# Patient Record
Sex: MALE | Race: OTHER | NOT HISPANIC OR LATINO | Employment: UNEMPLOYED | URBAN - METROPOLITAN AREA
[De-identification: names, ages, dates, MRNs, and addresses within clinical notes are randomized per-mention and may not be internally consistent; named-entity substitution may affect disease eponyms.]

---

## 2021-12-23 ENCOUNTER — HOSPITAL ENCOUNTER (EMERGENCY)
Facility: HOSPITAL | Age: 31
Discharge: LEFT BEFORE DISCHARGE | DRG: 770 | End: 2021-12-23
Attending: EMERGENCY MEDICINE | Admitting: EMERGENCY MEDICINE
Payer: MEDICARE

## 2021-12-23 VITALS
DIASTOLIC BLOOD PRESSURE: 64 MMHG | HEART RATE: 65 BPM | RESPIRATION RATE: 12 BRPM | OXYGEN SATURATION: 98 % | SYSTOLIC BLOOD PRESSURE: 108 MMHG | WEIGHT: 152.3 LBS | TEMPERATURE: 98.2 F

## 2021-12-23 DIAGNOSIS — F13.10 BENZODIAZEPINE ABUSE (HCC): ICD-10-CM

## 2021-12-23 DIAGNOSIS — F11.10 HEROIN ABUSE (HCC): Primary | ICD-10-CM

## 2021-12-23 DIAGNOSIS — F19.10 POLYSUBSTANCE ABUSE (HCC): ICD-10-CM

## 2021-12-23 LAB
AMPHETAMINES SERPL QL SCN: NEGATIVE
BARBITURATES UR QL: NEGATIVE
BENZODIAZ UR QL: NEGATIVE
COCAINE UR QL: POSITIVE
ETHANOL EXG-MCNC: 0 MG/DL
FLUAV RNA RESP QL NAA+PROBE: NEGATIVE
FLUBV RNA RESP QL NAA+PROBE: NEGATIVE
METHADONE UR QL: NEGATIVE
OPIATES UR QL SCN: POSITIVE
OXYCODONE+OXYMORPHONE UR QL SCN: NEGATIVE
PCP UR QL: NEGATIVE
RSV RNA RESP QL NAA+PROBE: NEGATIVE
SARS-COV-2 RNA RESP QL NAA+PROBE: NEGATIVE
THC UR QL: NEGATIVE

## 2021-12-23 PROCEDURE — 99282 EMERGENCY DEPT VISIT SF MDM: CPT | Performed by: EMERGENCY MEDICINE

## 2021-12-23 PROCEDURE — 80307 DRUG TEST PRSMV CHEM ANLYZR: CPT | Performed by: EMERGENCY MEDICINE

## 2021-12-23 PROCEDURE — 82075 ASSAY OF BREATH ETHANOL: CPT | Performed by: EMERGENCY MEDICINE

## 2021-12-23 PROCEDURE — 99284 EMERGENCY DEPT VISIT MOD MDM: CPT

## 2021-12-23 PROCEDURE — 0241U HB NFCT DS VIR RESP RNA 4 TRGT: CPT | Performed by: EMERGENCY MEDICINE

## 2021-12-23 RX ORDER — CLONIDINE HYDROCHLORIDE 0.1 MG/1
0.2 TABLET ORAL EVERY 12 HOURS SCHEDULED
Status: DISCONTINUED | OUTPATIENT
Start: 2021-12-23 | End: 2021-12-23 | Stop reason: HOSPADM

## 2021-12-23 RX ORDER — ACETAMINOPHEN 325 MG/1
650 TABLET ORAL EVERY 6 HOURS PRN
Status: DISCONTINUED | OUTPATIENT
Start: 2021-12-23 | End: 2021-12-23 | Stop reason: HOSPADM

## 2021-12-23 RX ORDER — ONDANSETRON 4 MG/1
4 TABLET, ORALLY DISINTEGRATING ORAL EVERY 6 HOURS PRN
Status: DISCONTINUED | OUTPATIENT
Start: 2021-12-23 | End: 2021-12-23 | Stop reason: HOSPADM

## 2021-12-23 RX ORDER — LORAZEPAM 1 MG/1
1 TABLET ORAL EVERY 6 HOURS PRN
Status: DISCONTINUED | OUTPATIENT
Start: 2021-12-23 | End: 2021-12-23 | Stop reason: HOSPADM

## 2021-12-23 RX ORDER — LOPERAMIDE HYDROCHLORIDE 2 MG/1
2 CAPSULE ORAL 4 TIMES DAILY PRN
Status: DISCONTINUED | OUTPATIENT
Start: 2021-12-23 | End: 2021-12-23 | Stop reason: HOSPADM

## 2021-12-23 RX ADMIN — CLONIDINE HYDROCHLORIDE 0.2 MG: 0.1 TABLET ORAL at 01:17

## 2021-12-23 NOTE — ED PROVIDER NOTES
History  Chief Complaint   Patient presents with    Detox Evaluation     wants detox for heroin and benzos  last heroin use yesterday morning  last benzo use 3 days ago  51-year-old gentleman presents requesting rehab/detox for abuse of heroin and benzos  Reports that he uses approximately two bundles of heroin daily and experiences withdrawal symptoms within about 24 hours  He also uses benzos intermittently  Denies any acute psychiatric issues and states his last time in rehab was several months ago  He reports that remain clean for about 1-2 months and then resumed use whenever relative passed away  Currently he denies any acute medical issues or concerns  Drug Problem  Severity:  Severe  Onset quality:  Gradual  Timing:  Constant  Progression:  Unchanged  Chronicity:  Recurrent  Relieved by:  Not using drugs  Worsened by:  Using drugs  Ineffective treatments:  Previous rehab  Associated symptoms: no abdominal pain, no chest pain, no congestion, no diarrhea, no fatigue, no fever, no myalgias, no nausea, no rhinorrhea, no sore throat and no vomiting        None       Past Medical History:   Diagnosis Date    Hepatitis C        Past Surgical History:   Procedure Laterality Date    TONSILLECTOMY         History reviewed  No pertinent family history  I have reviewed and agree with the history as documented  E-Cigarette/Vaping     E-Cigarette/Vaping Substances    Nicotine No     THC No     CBD No     Flavoring No     Other No     Unknown No      Social History     Tobacco Use    Smoking status: Current Every Day Smoker     Packs/day: 1 00    Smokeless tobacco: Never Used   Vaping Use    Vaping Use: Not on file   Substance Use Topics    Alcohol use: Yes     Comment: socially    Drug use: Yes     Types: Benzodiazepines, Heroin       Review of Systems   Constitutional: Negative for activity change, chills, fatigue and fever  HENT: Negative    Negative for congestion, postnasal drip, rhinorrhea, sinus pain, sore throat and trouble swallowing  Eyes: Negative  Respiratory: Negative  Cardiovascular: Negative for chest pain  Gastrointestinal: Negative for abdominal pain, constipation, diarrhea, nausea and vomiting  Endocrine: Negative  Genitourinary: Negative  Musculoskeletal: Negative  Negative for arthralgias, back pain and myalgias  Skin: Negative  Allergic/Immunologic: Negative  Neurological: Negative  Hematological: Negative  Psychiatric/Behavioral: Negative  Negative for suicidal ideas  All other systems reviewed and are negative  Physical Exam  Physical Exam  Vitals and nursing note reviewed  Constitutional:       General: He is not in acute distress  Appearance: Normal appearance  He is well-developed  He is not ill-appearing, toxic-appearing or diaphoretic  HENT:      Head: Normocephalic and atraumatic  Right Ear: External ear normal       Left Ear: External ear normal       Nose: Nose normal       Mouth/Throat:      Mouth: Mucous membranes are moist       Pharynx: Oropharynx is clear  Eyes:      Conjunctiva/sclera: Conjunctivae normal       Pupils: Pupils are equal, round, and reactive to light  Cardiovascular:      Rate and Rhythm: Normal rate and regular rhythm  Heart sounds: Normal heart sounds  Pulmonary:      Effort: Pulmonary effort is normal  No respiratory distress  Breath sounds: Normal breath sounds  Abdominal:      General: Bowel sounds are normal  There is no distension  Palpations: Abdomen is soft  Tenderness: There is no abdominal tenderness  There is no guarding  Musculoskeletal:         General: Normal range of motion  Cervical back: Neck supple  No rigidity  Right lower leg: No edema  Left lower leg: No edema  Skin:     General: Skin is warm and dry  Capillary Refill: Capillary refill takes less than 2 seconds  Neurological:      General: No focal deficit present  Mental Status: He is alert and oriented to person, place, and time  Psychiatric:         Mood and Affect: Mood normal          Behavior: Behavior normal          Vital Signs  ED Triage Vitals [12/23/21 0043]   Temperature Pulse Respirations Blood Pressure SpO2   97 8 °F (36 6 °C) 97 16 121/89 96 %      Temp Source Heart Rate Source Patient Position - Orthostatic VS BP Location FiO2 (%)   Tympanic Monitor Sitting Left arm --      Pain Score       --           Vitals:    12/23/21 0043   BP: 121/89   Pulse: 97   Patient Position - Orthostatic VS: Sitting         Visual Acuity      ED Medications  Medications - No data to display    Diagnostic Studies  Results Reviewed     None                 No orders to display              Procedures  Procedures         ED Course                                             MDM    Disposition  Final diagnoses:   None     ED Disposition     None      Follow-up Information    None         Patient's Medications    No medications on file       No discharge procedures on file      PDMP Review     None          ED Provider  Electronically Signed by           Sha Henry DO  12/23/21 3250

## 2021-12-23 NOTE — ED NOTES
[place] PROMISe indicates: Active  Recipient #[id number]  [facility] managed by [facility]  Phone number: [phone]

## 2021-12-23 NOTE — ED NOTES
Referral made to [facility]  and [employer]  Bed on hold at [facility]  Patient is completing a prescreen with [facility]

## 2021-12-23 NOTE — ED NOTES
Pt walked of room and this RN followed to confirm pt did not have an IV   Pt was pleasant and stated he wanted to go home and put his arms out to prove he had no IV     Karla Guillory RN  12/23/21 [address] [facility]  12/23/21 8287

## 2021-12-23 NOTE — ED NOTES
Pt laying in bed with no complaints at this time       Aster Hendrix, BAM  12/23/21 [address], RN  12/23/21 9772

## 2021-12-24 ENCOUNTER — HOSPITAL ENCOUNTER (INPATIENT)
Facility: HOSPITAL | Age: 31
LOS: 1 days | Discharge: LEFT BEFORE DISCHARGE | DRG: 770 | End: 2021-12-24
Attending: EMERGENCY MEDICINE | Admitting: EMERGENCY MEDICINE
Payer: MEDICARE

## 2021-12-24 VITALS
DIASTOLIC BLOOD PRESSURE: 50 MMHG | SYSTOLIC BLOOD PRESSURE: 136 MMHG | TEMPERATURE: 97.6 F | WEIGHT: 152.6 LBS | RESPIRATION RATE: 16 BRPM | HEART RATE: 57 BPM | OXYGEN SATURATION: 99 % | HEIGHT: 67 IN | BODY MASS INDEX: 23.95 KG/M2

## 2021-12-24 DIAGNOSIS — F41.9 ANXIETY AND DEPRESSION: ICD-10-CM

## 2021-12-24 DIAGNOSIS — F32.A ANXIETY AND DEPRESSION: ICD-10-CM

## 2021-12-24 DIAGNOSIS — F19.239 DRUG WITHDRAWAL (HCC): Primary | ICD-10-CM

## 2021-12-24 PROBLEM — F13.20 MODERATE BENZODIAZEPINE USE DISORDER (HCC): Status: ACTIVE | Noted: 2021-12-24

## 2021-12-24 PROBLEM — B18.2 CHRONIC HEPATITIS C WITHOUT HEPATIC COMA (HCC): Status: ACTIVE | Noted: 2021-12-24

## 2021-12-24 PROBLEM — F11.23 OPIOID WITHDRAWAL (HCC): Status: ACTIVE | Noted: 2021-12-24

## 2021-12-24 PROBLEM — F11.93 OPIOID WITHDRAWAL (HCC): Status: ACTIVE | Noted: 2021-12-24

## 2021-12-24 PROBLEM — F13.930 BENZODIAZEPINE WITHDRAWAL WITHOUT COMPLICATION (HCC): Status: ACTIVE | Noted: 2021-12-24

## 2021-12-24 PROBLEM — F17.210 TOBACCO DEPENDENCE DUE TO CIGARETTES: Status: ACTIVE | Noted: 2021-12-24

## 2021-12-24 PROBLEM — F13.230 BENZODIAZEPINE WITHDRAWAL WITHOUT COMPLICATION (HCC): Status: ACTIVE | Noted: 2021-12-24

## 2021-12-24 PROBLEM — F11.20 OPIOID USE DISORDER, SEVERE, DEPENDENCE (HCC): Status: ACTIVE | Noted: 2021-12-24

## 2021-12-24 PROBLEM — F19.90 IVDU (INTRAVENOUS DRUG USER): Status: ACTIVE | Noted: 2021-12-24

## 2021-12-24 LAB
ALBUMIN SERPL BCP-MCNC: 3.6 G/DL (ref 3–5.2)
ALP SERPL-CCNC: 68 U/L (ref 43–122)
ALT SERPL W P-5'-P-CCNC: 54 U/L
AMPHETAMINES SERPL QL SCN: NEGATIVE
ANION GAP SERPL CALCULATED.3IONS-SCNC: 1 MMOL/L (ref 5–14)
AST SERPL W P-5'-P-CCNC: 67 U/L (ref 17–59)
ATRIAL RATE: 64 BPM
BARBITURATES UR QL: NEGATIVE
BENZODIAZ UR QL: NEGATIVE
BILIRUB SERPL-MCNC: 0.48 MG/DL
BUN SERPL-MCNC: 18 MG/DL (ref 5–25)
CALCIUM SERPL-MCNC: 8.8 MG/DL (ref 8.4–10.2)
CHLORIDE SERPL-SCNC: 103 MMOL/L (ref 97–108)
CO2 SERPL-SCNC: 29 MMOL/L (ref 22–30)
COCAINE UR QL: POSITIVE
CREAT SERPL-MCNC: 1.12 MG/DL (ref 0.7–1.5)
EOSINOPHIL # BLD AUTO: 0.28 THOUSAND/UL (ref 0–0.4)
EOSINOPHIL NFR BLD MANUAL: 3 % (ref 0–6)
ERYTHROCYTE [DISTWIDTH] IN BLOOD BY AUTOMATED COUNT: 13.5 %
FLUAV RNA RESP QL NAA+PROBE: NEGATIVE
FLUBV RNA RESP QL NAA+PROBE: NEGATIVE
GFR SERPL CREATININE-BSD FRML MDRD: 87 ML/MIN/1.73SQ M
GLUCOSE SERPL-MCNC: 117 MG/DL (ref 70–99)
HCT VFR BLD AUTO: 35.8 % (ref 41–53)
HGB BLD-MCNC: 12.5 G/DL (ref 13.5–17.5)
LYMPHOCYTES # BLD AUTO: 3.07 THOUSAND/UL (ref 0.5–4)
LYMPHOCYTES # BLD AUTO: 33 % (ref 25–45)
MCH RBC QN AUTO: 31.2 PG (ref 26–34)
MCHC RBC AUTO-ENTMCNC: 34.9 G/DL (ref 31–36)
MCV RBC AUTO: 89 FL (ref 80–100)
METHADONE UR QL: NEGATIVE
MONOCYTES # BLD AUTO: 1.12 THOUSAND/UL (ref 0.2–0.9)
MONOCYTES NFR BLD AUTO: 12 % (ref 1–10)
NEUTS SEG # BLD: 4.84 THOUSAND/UL (ref 1.8–7.8)
NEUTS SEG NFR BLD AUTO: 52 %
OPIATES UR QL SCN: POSITIVE
OXYCODONE+OXYMORPHONE UR QL SCN: NEGATIVE
P AXIS: 47 DEGREES
PCP UR QL: NEGATIVE
PLATELET # BLD AUTO: 306 THOUSANDS/UL (ref 150–450)
PLATELET BLD QL SMEAR: ADEQUATE
PMV BLD AUTO: 7.1 FL (ref 8.9–12.7)
POTASSIUM SERPL-SCNC: 4.4 MMOL/L (ref 3.6–5)
PR INTERVAL: 112 MS
PROT SERPL-MCNC: 6.7 G/DL (ref 5.9–8.4)
QRS AXIS: 48 DEGREES
QRSD INTERVAL: 84 MS
QT INTERVAL: 386 MS
QTC INTERVAL: 398 MS
RBC # BLD AUTO: 4.01 MILLION/UL (ref 4.5–5.9)
RBC MORPH BLD: NORMAL
RSV RNA RESP QL NAA+PROBE: NEGATIVE
SARS-COV-2 RNA RESP QL NAA+PROBE: NEGATIVE
SODIUM SERPL-SCNC: 133 MMOL/L (ref 137–147)
T WAVE AXIS: 29 DEGREES
THC UR QL: NEGATIVE
TOTAL CELLS COUNTED SPEC: 100
VENTRICULAR RATE: 64 BPM
WBC # BLD AUTO: 9.3 THOUSAND/UL (ref 4.5–11)

## 2021-12-24 PROCEDURE — 96374 THER/PROPH/DIAG INJ IV PUSH: CPT

## 2021-12-24 PROCEDURE — 99285 EMERGENCY DEPT VISIT HI MDM: CPT | Performed by: PHYSICIAN ASSISTANT

## 2021-12-24 PROCEDURE — 93010 ELECTROCARDIOGRAM REPORT: CPT | Performed by: INTERNAL MEDICINE

## 2021-12-24 PROCEDURE — 93005 ELECTROCARDIOGRAM TRACING: CPT

## 2021-12-24 PROCEDURE — 85027 COMPLETE CBC AUTOMATED: CPT | Performed by: PHYSICIAN ASSISTANT

## 2021-12-24 PROCEDURE — 80307 DRUG TEST PRSMV CHEM ANLYZR: CPT | Performed by: PHYSICIAN ASSISTANT

## 2021-12-24 PROCEDURE — 36415 COLL VENOUS BLD VENIPUNCTURE: CPT | Performed by: PHYSICIAN ASSISTANT

## 2021-12-24 PROCEDURE — NC001 PR NO CHARGE: Performed by: PHYSICIAN ASSISTANT

## 2021-12-24 PROCEDURE — 96361 HYDRATE IV INFUSION ADD-ON: CPT

## 2021-12-24 PROCEDURE — 99235 HOSP IP/OBS SAME DATE MOD 70: CPT | Performed by: PHYSICIAN ASSISTANT

## 2021-12-24 PROCEDURE — 99284 EMERGENCY DEPT VISIT MOD MDM: CPT

## 2021-12-24 PROCEDURE — 85007 BL SMEAR W/DIFF WBC COUNT: CPT | Performed by: PHYSICIAN ASSISTANT

## 2021-12-24 PROCEDURE — 0241U HB NFCT DS VIR RESP RNA 4 TRGT: CPT | Performed by: PHYSICIAN ASSISTANT

## 2021-12-24 PROCEDURE — HZ2ZZZZ DETOXIFICATION SERVICES FOR SUBSTANCE ABUSE TREATMENT: ICD-10-PCS | Performed by: EMERGENCY MEDICINE

## 2021-12-24 PROCEDURE — 80053 COMPREHEN METABOLIC PANEL: CPT | Performed by: PHYSICIAN ASSISTANT

## 2021-12-24 PROCEDURE — 99253 IP/OBS CNSLTJ NEW/EST LOW 45: CPT | Performed by: PSYCHIATRY & NEUROLOGY

## 2021-12-24 RX ORDER — CLONIDINE HYDROCHLORIDE 0.1 MG/1
0.1 TABLET ORAL EVERY 6 HOURS PRN
Status: DISCONTINUED | OUTPATIENT
Start: 2021-12-24 | End: 2021-12-24 | Stop reason: HOSPADM

## 2021-12-24 RX ORDER — ONDANSETRON 2 MG/ML
4 INJECTION INTRAMUSCULAR; INTRAVENOUS ONCE
Status: COMPLETED | OUTPATIENT
Start: 2021-12-24 | End: 2021-12-24

## 2021-12-24 RX ORDER — NICOTINE 21 MG/24HR
1 PATCH, TRANSDERMAL 24 HOURS TRANSDERMAL DAILY
Status: DISCONTINUED | OUTPATIENT
Start: 2021-12-24 | End: 2021-12-24 | Stop reason: HOSPADM

## 2021-12-24 RX ORDER — ESCITALOPRAM OXALATE 10 MG/1
10 TABLET ORAL DAILY
Status: DISCONTINUED | OUTPATIENT
Start: 2021-12-24 | End: 2021-12-24 | Stop reason: HOSPADM

## 2021-12-24 RX ORDER — MAGNESIUM HYDROXIDE/ALUMINUM HYDROXICE/SIMETHICONE 120; 1200; 1200 MG/30ML; MG/30ML; MG/30ML
30 SUSPENSION ORAL EVERY 4 HOURS PRN
Status: DISCONTINUED | OUTPATIENT
Start: 2021-12-24 | End: 2021-12-24 | Stop reason: HOSPADM

## 2021-12-24 RX ORDER — SODIUM CHLORIDE 9 MG/ML
125 INJECTION, SOLUTION INTRAVENOUS CONTINUOUS
Status: DISCONTINUED | OUTPATIENT
Start: 2021-12-24 | End: 2021-12-24 | Stop reason: HOSPADM

## 2021-12-24 RX ORDER — ONDANSETRON 2 MG/ML
4 INJECTION INTRAMUSCULAR; INTRAVENOUS EVERY 6 HOURS PRN
Status: DISCONTINUED | OUTPATIENT
Start: 2021-12-24 | End: 2021-12-24 | Stop reason: HOSPADM

## 2021-12-24 RX ORDER — GABAPENTIN 300 MG/1
300 CAPSULE ORAL EVERY 8 HOURS PRN
Status: DISCONTINUED | OUTPATIENT
Start: 2021-12-24 | End: 2021-12-24 | Stop reason: HOSPADM

## 2021-12-24 RX ADMIN — SODIUM CHLORIDE 1000 ML: 0.9 INJECTION, SOLUTION INTRAVENOUS at 02:19

## 2021-12-24 RX ADMIN — SODIUM CHLORIDE 2000 ML: 0.9 INJECTION, SOLUTION INTRAVENOUS at 10:10

## 2021-12-24 RX ADMIN — ESCITALOPRAM OXALATE 10 MG: 10 TABLET ORAL at 08:21

## 2021-12-24 RX ADMIN — ONDANSETRON 4 MG: 2 INJECTION INTRAMUSCULAR; INTRAVENOUS at 02:19

## 2021-12-24 RX ADMIN — MULTIPLE VITAMINS W/ MINERALS TAB 1 TABLET: TAB ORAL at 08:21

## 2021-12-24 RX ADMIN — NICOTINE 1 PATCH: 14 PATCH, EXTENDED RELEASE TRANSDERMAL at 08:20

## 2021-12-24 RX ADMIN — SODIUM CHLORIDE 125 ML/HR: 0.9 INJECTION, SOLUTION INTRAVENOUS at 04:47

## 2021-12-24 NOTE — UTILIZATION REVIEW
Initial Clinical Review    Admission: Date/Time/Statement:   Admission Orders (From admission, onward)     Ordered        12/24/21 0259  Inpatient Admission  Once                      Orders Placed This Encounter   Procedures    Inpatient Admission     Standing Status:   Standing     Number of Occurrences:   1     Order Specific Question:   Level of Care     Answer:   Med Surg [16]     Order Specific Question:   Estimated length of stay     Answer:   More than 2 Midnights     Order Specific Question:   Certification     Answer:   I certify that inpatient services are medically necessary for this patient for a duration of greater than two midnights  See H&P and MD Progress Notes for additional information about the patient's course of treatment  ED Arrival Information     Expected Arrival Acuity    - 12/24/2021 01:02 Urgent         Means of arrival Escorted by Service Admission type    Walk-In Self Medical Toxicology Urgent         Arrival complaint    detox eval         Chief Complaint   Patient presents with    Detox Evaluation     Patient wants detox, seen earlier yesterday and left, is returning again for detox  Did heroin 1 hour after he left her yesterday  Initial Presentation: 32year old male, presented to the ED @ [facility] [place], from home, via walk in with mother     Admitted as Inpatient due to Opiod Withdrawal      PMH chronic hep C, IVDU, opioid use disorder on suboxone, benzodiazepine use disorder, anxiety/depression  Date:12/24/2021  History of IV heroin abuse benzo using cocaine use presents requesting detox  Patient reports that he initially starting using opioid in his teens, starting with percocets; eventually transitioned to snorting heroin at age 24 and injecting heroin at age 22  Currently injects 2 bundles daily  Notes he only injects in bilateral AC fossae, denies injecting elsewhere including hands, feet, neck   Notes he reuses his own needles, however does not share needles with others  Patient has history of hepatitis C, previously prescribed Mavyret however notes he never started treatment  Patient states he has been to rehab in past for opioid use, noting he was previously on methadone in 2010  States he was in rehab at [facility] a little over 6 months ago  Patient currently prescribed maintenance suboxone 8 mg BID through [facility], notes he has been going there bi-weekly for a little over 1 month  Reports he previously went to [facility] x 3 months  Reports he has not taken suboxone in > 1 week       Also reports history of benzodiazepine use since his teens, reporting "off and on use" since age 25  Currently uses 2 mg klonopin every 1-2 days x 6 months, last use 1 5 days ago  States he gets supply off the streets  Denies h/o benzodiazepine withdrawal seizure       Notes h/o anxiety and depression, states he currently does not follow outpatient psychiatry  Prescribed lexapro 10 mg daily, however has been non-compliant recently  Reports recent death of his grandfather has triggered worsening depression and increase in substance use  He notes he is motivated to make a change and pursue rehab       Opioids currently used: heroin and fentanyl  Route of use: intravenous  Date/Time of Last Opioid Use: sometime afternoon of 12/23/2021; 2 bundles   Current Signs/Symptoms of Opioid Withdrawal: anxiety     COWS score:   Clinical Opiate Withdrawal Scale  Pulse: 60    This patient qualifies for Level IV medically managed intensive inpatient services under the criteria set by the American Society of Addiction Medicine, including dimensions 1-3   The patient is in withdrawal (or is intoxicated with high risk of withdrawal), with severe and unstable medical and/or psychiatric (dual diagnosis) problems, requiring requires 24-hour medical and nursing care and the full resources of a licensed hospital       12/24/2021 Consult Behavioral Health:  Armando Win is a 32 y o  male with a history of depression, anxiety, polysubstance abuse including heroin, benzodiazepines, fentanyl, chronic hep C and IVDU who was requesting inpatient detox admission in the hopes of attending outpatient rehab in the setting of recent binge of cocaine and heroin  Does report previous psych history of depression and anxiety previously managed by SSRI Lexapro however recently became noncompliant and relapsed on illicit substances  He reports decreased mood and sadness due to the recent loss of his grandfather which he is still grieving  He does not feel that he requires inpatient psychiatric treatment and is requesting to be discharged to rehab once medically stable  He was restarted on his Lexapro 10 milligrams p o  daily  We did discuss his previous episodes of mood changes including elevation and was concerned of possible underlying diagnosis of bipolar disorder and induction of gentry with antidepressant medication  However, patient was insistent on continuing his Lexapro since he felt that this was helpful for him in the past    Treatment Plan:   Planned Medication Changes: All current active medications have been reviewed  Encourage group therapy, milieu therapy and occupational therapy  Behavioral Health checks every 7 minutes  Continue Lexapro 10 milligrams p o  Daily  Labs reviewed - would recommend to follow-up with BMP prior to discharge to monitor for any SSRI induced hyponatremia  Sodium today on 12/24/2021 = 133  Patient is currently not a risk to himself or others and does not require inpatient psychiatric admission at this time; does not meet 302 criteria  VTE Prophylaxis: none indicated, low risk   / sequential compression device     --> Reportedly, mother has secured the patient a bed a [facility], and patient no longer wishes to stay in this detox unit  Patient abruptly left the Unit  The patient had capacity to decide to leave without medical treatment    An AMA was not able to be obtained as the patient walked out of the unit      ED Triage Vitals [12/24/21 0129]   Temperature Pulse Respirations Blood Pressure SpO2   97 6 °F (36 4 °C) 85 16 118/58 95 %      Temp Source Heart Rate Source Patient Position - Orthostatic VS BP Location FiO2 (%)   Tympanic Monitor Sitting Left arm --      Pain Score       No Pain          Wt Readings from Last 1 Encounters:   12/24/21 69 2 kg (152 lb 9 6 oz)     Additional Vital Signs:   Date/Time Temp Pulse Resp BP MAP (mmHg) SpO2 O2 Device Patient Position - Orthostatic VS   12/24/21 1500 -- 57 16 136/50 -- 99 % None (Room air) Lying   12/24/21 1300 -- 57 -- 110/67 -- 98 % None (Room air) --   12/24/21 1259 -- 60 -- 96/74 -- 96 % None (Room air) --   12/24/21 1217 -- 54 Abnormal  -- 110/69 -- 99 % None (Room air) --   12/24/21 1100 -- 54 Abnormal  -- 94/49 Abnormal  -- 98 % None (Room air) Lying   12/24/21 0900 -- 54 Abnormal  -- 89/44 Abnormal  -- 98 % None (Room air) Lying   12/24/21 0800 97 6 °F (36 4 °C) 56 16 87/49 Abnormal  -- -- -- Lying   12/24/21 0706 97 8 °F (36 6 °C) 60 16 85/40 Abnormal  -- 94 % None (Room air) Lying   12/24/21 0505 99 1 °F (37 3 °C) 66 16 95/50 65 96 % None (Room air) Lying   12/24/21 0419 -- -- -- 84/62 Abnormal  -- -- -- Lying   12/24/21 0406 98 5 °F (36 9 °C) 63 16 87/48 Abnormal  61 96 % None (Room air) Lying     Date and Time Eye Opening Best Verbal Response Best Motor Response Joelle Coma Scale Score   12/24/21 0800 4 5 6 15     Pertinent Labs/Diagnostic Test Results:   Results from last 7 days   Lab Units 12/24/21 0221 12/23/21  0119   SARS-COV-2  Negative Negative     Results from last 7 days   Lab Units 12/24/21  0221   WBC Thousand/uL 9 30   HEMOGLOBIN g/dL 12 5*   HEMATOCRIT % 35 8*   PLATELETS Thousands/uL 306   TOTAL NEUT ABS Thousand/uL 4 84     Results from last 7 days   Lab Units 12/24/21  0221   SODIUM mmol/L 133*   POTASSIUM mmol/L 4 4   CHLORIDE mmol/L 103   CO2 mmol/L 29   ANION GAP mmol/L 1* BUN mg/dL 18   CREATININE mg/dL 1 12   EGFR ml/min/1 73sq m 87   CALCIUM mg/dL 8 8     Results from last 7 days   Lab Units 12/24/21 0221   AST U/L 67*   ALT U/L 54*   ALK PHOS U/L 68   TOTAL PROTEIN g/dL 6 7   ALBUMIN g/dL 3 6   TOTAL BILIRUBIN mg/dL 0 48     Results from last 7 days   Lab Units 12/24/21 0221   GLUCOSE RANDOM mg/dL 117*       Results from last 7 days   Lab Units 12/24/21 0221 12/23/21  0119   INFLUENZA A PCR  Negative Negative   INFLUENZA B PCR  Negative Negative   RSV PCR  Negative Negative     Results from last 7 days   Lab Units 12/24/21  0208   AMPH/METH  Negative   BARBITURATE UR  Negative   BENZODIAZEPINE UR  Negative   COCAINE UR  Positive*   METHADONE URINE  Negative   OPIATE UR  Positive*   PCP UR  Negative   THC UR  Negative     Results from last 7 days   Lab Units 12/24/21 0221   TOTAL COUNTED  100     ED Treatment:   Medication Administration from 12/24/2021 0101 to 12/24/2021 0331       Date/Time Order Dose Route Action     12/24/2021 0219 ondansetron (ZOFRAN) injection 4 mg 4 mg Intravenous Given     12/24/2021 0219 sodium chloride 0 9 % bolus 1,000 mL 1,000 mL Intravenous New Bag        Past Medical History:   Diagnosis Date    Hepatitis C      Present on Admission:   [facility]   Benzodiazepine withdrawal without complication (HCC)   Chronic hepatitis C without hepatic coma ([facility] )   IVDU (intravenous drug user)   Anxiety and depression   Tobacco dependence due to cigarettes   Moderate benzodiazepine use disorder ([facility] )      Admitting Diagnosis: Encounter for assessment of alcohol and drug use [Z76 [id number]]  Age/Sex: 32 y o  male  Admission Orders:  Fall precautions  Seizure precautions  Telemetry  Continuous Pulse Ox      [place] SCDs    Scheduled Medications:  escitalopram, 10 mg, Oral, Daily  [START ON 12/25/2021] influenza vaccine, 0 5 mL, Intramuscular, Once  multivitamin-minerals, 1 tablet, Oral, Daily  nicotine, 1 patch, Transdermal, Daily      Continuous IV Infusions:  sodium chloride, 125 mL/hr, Intravenous, Continuous      PRN Meds:  aluminum-magnesium hydroxide-simethicone, 30 mL, Oral, Q4H PRN  cloNIDine, 0 1 mg, Oral, Q6H PRN  gabapentin, 300 mg, Oral, Q8H PRN  ondansetron, 4 mg, Intravenous, Q6H PRN        IP CONSULT TO CASE MANAGEMENT  IP CONSULT TO PSYCHIATRY    Network Utilization Review Department  ATTENTION: Please call with any questions or concerns to [phone] and carefully listen to the prompts so that you are directed to the right person  All voicemails are confidential   [place] Doing all requests for admission clinical reviews, approved or denied determinations and any other requests to dedicated fax number below belonging to the campus where the patient is receiving treatment   List of dedicated fax numbers for the Facilities:  [address] DENIALS (Administrative/Medical Necessity) [phone]   [address] (Maternity/NICU/Pediatrics) [phone]   [address] [place] [address]  [address] [facility] [place] [address] [id number] [place] [address] [address] [facility] [facility] [phone]     ,

## 2021-12-24 NOTE — ASSESSMENT & PLAN NOTE
· Reports history of benzodiazepine use since teens  · Currently uses 2 mg klonopin every 1-2 days, last use 1 5 days ago   · Follow SEWS for medically-assisted benzodiazepine withdrawal   · Phenobarbital initially held in light of BP 84/62

## 2021-12-24 NOTE — ASSESSMENT & PLAN NOTE
· Patient reports h/o opioid abuse since his teens   · Initially started with percocets at age 12, started snorting heroin at age 24, and injecting IV heroin at age 22  · Currently injecting 2 bundles heroin daily x 6 months   · Currently prescribed suboxone 8 mg BID via [facility]  · [address] reviewed- most recent script 12/22/2021: suboxone 8-2 mg (28 tabs, 14 days)  · Reports he has not used suboxone in >1 week  · Follow COWS/MAT protocol for medically-assisted opioid withdrawal  · Transition to COWS once SEWS protocol complete  · Consult case management for assistance with rehab resources

## 2021-12-24 NOTE — ASSESSMENT & PLAN NOTE
· Currently does not follow with outpatient psychiatry  · States he is prescribed lexapro 10 mg daily, but has not been taking recently  · Also notes he occasionally takes seroquel 50 mg at bedtime PRN  · Reports recent worsening of symptoms 2/2 grandfather's recent passing as well as subsequent increase in substance use as coping mechanism  · Currently denies SIs/HIs  · Continue lexapro   · Consult psychiatry, appreciate recommendations

## 2021-12-24 NOTE — H&P
[facility]  H&P- Jhon Razo 1990, 32 y o  male MRN: [id number]  Unit/Bed#: 5T DETOX 505-01 Encounter: [id number]  Primary Care Provider: Dianna Roberts MD   Date and time admitted to hospital: 12/24/2021  1:28 AM      HISTORY & PHYSICAL [facility]  LEVEL 4 MEDICAL DETOX UNIT  Jhon Razo 32 y o  male MRN: [id number]  Unit/Bed#: 5T DETOX 505-01 Encounter: [id number]      Reason for Admission/Principal Problem: Opioid withdrawal, opioid use disorder  Admitting Provider: Jordan Norris PA-C  Attending Provider: Amber Roper DO   12/24/2021  1:28 AM      Benzodiazepine withdrawal without complication ([facility] )  Assessment & Plan  · H/o benzo use since teens ("off and on since age 25"), denies h/o withdrawal seizures    · Currently uses 2 mg klonopin every 1-2 days x 6 months, last use 1 5 days ago  · Follow SEWS protocol for medically-assisted benzodiazepine withdrawal  · Initial SEWS 6, however phenobarbital held in light of low BP of 84/62    * Opioid withdrawal (HCC)  Assessment & Plan  · Currently injecting 2 bundles heroin daily x 6 months   · Last use afternoon of 12/23/2021  · Follow COWS/MAT protocol for medically-assisted opioid withdrawal  · Transition to COWS once SEWS protocol complete  · Order adjunct symptomatic treatment     Moderate benzodiazepine use disorder ([facility] )  Assessment & Plan  · Reports history of benzodiazepine use since teens  · Currently uses 2 mg klonopin every 1-2 days, last use 1 5 days ago   · Follow SEWS for medically-assisted benzodiazepine withdrawal   · Phenobarbital initially held in light of BP 84/62    Opioid use disorder, severe, dependence ([facility] )  Assessment & Plan  · Patient reports h/o opioid abuse since his teens   · Initially started with percocets at age 12, started snorting heroin at age 24, and injecting IV heroin at age 22  · Currently injecting 2 bundles heroin daily x 6 months · Currently prescribed suboxone 8 mg BID via [facility]  · [address] reviewed- most recent script 12/22/2021: suboxone 8-2 mg (28 tabs, 14 days)  · Reports he has not used suboxone in >1 week  · Follow COWS/MAT protocol for medically-assisted opioid withdrawal  · Transition to COWS once SEWS protocol complete  · Consult case management for assistance with rehab resources    IVDU (intravenous drug user)  Assessment & Plan  · Reports current IV drug user- heroin/fentanyl and cocaine  · States he will reuse needles, denies sharing needles with others  · Reports he injects only in b/l AC fossae  · Track marks visible b/l AC fossa, no evidence of abscess/acute infection currently   · H/o of + hep C, untreated   · Recommend outpatient follow-up with GI for further management of hep C  · UDS 12/24/2021: + cocaine, opiates  · Check HIV, acute hepatitis panel  · Encourage cessation    Chronic hepatitis C without hepatic coma ([facility] )  Assessment & Plan  · Patient with h/o hep c in setting of IVDU  · Hepatitis C quantitative PCR 7/20/2021: [phone]  · Patient states he was previously prescribed treatment ([facility]) however states he never took it  · IVDU is ongoing  · CMP 12/24/2021:  LFTs AST 67, ALT 54, alk-phos 68   T bili 0 48  · Encourage cessation of IVDU  · Recommend outpatient GI follow-up for ongoing monitoring/maintenance    Tobacco dependence due to cigarettes  Assessment & Plan  Current every day smoker: 1 ppd  Encourage cessation  Offer nicotine replacement    Anxiety and depression  Assessment & Plan  · Currently does not follow with outpatient psychiatry  · States he is prescribed lexapro 10 mg daily, but has not been taking recently  · Also notes he occasionally takes seroquel 50 mg at bedtime PRN  · Reports recent worsening of symptoms 2/2 grandfather's recent passing as well as subsequent increase in substance use as coping mechanism  · Currently denies SIs/HIs  · Continue lexapro   · Consult psychiatry, appreciate recommendations  Additional medical treatment(s) includes: IVDU, hep c, anxiety/depression       VTE Prophylaxis: none indicated, low risk   / sequential compression device   Code Status:  Level 1 full code  POLST: POLST form is not discussed and not completed at this time  Discussion with family:  None at this time    Anticipated Length of Stay:  Patient will be admitted on an Inpatient basis with an anticipated length of stay of  >2  midnights  Justification for Hospital Stay:  Ongoing management of medically-assisted opioid and benzodiazepine withdrawal    For any questions or concerns, please Tiger Text the advanced practitioner in the role of [facility]-DETOX-AP On Call      This patient qualifies for Level IV medically managed intensive inpatient services under the criteria set by the American Society of Addiction Medicine, including dimensions 1-3  The patient is in withdrawal (or is intoxicated with high risk of withdrawal), with severe and unstable medical and/or psychiatric (dual diagnosis) problems, requiring requires 24-hour medical and nursing care and the full resources of a [address] patient to medical detox unit and continue supportive care and stabilization of acute opioid withdrawal per medical toxicology/detox medication assisted treatment pathway  Monitor opioid severity via Clinical Opioid Withdrawal Scale (COWS) Q4 hours and administer buprenorphine/naloxone 8mg/2mg when COWS >8, or when greater than 24 hours have elapsed from most recent opioid use (excluding long-acting opioids, such as methadone)  Continue to monitor opioid severity Q30-60 minutes after first dose and administer additional buprenorphine 2-4mg every 30-60 minutes until COWS < 8 for two consecutive hours  (Max dose 32 mg)               Adjunctive medications administered as needed:  Clonidine 0 1 mg PO Q6 hours PRN anxiety or palpitations Gabapentin 300mg PO Q8 hours PRN anxiety    Ibuprofen 600 mg PO Q6 hours PRN pain    Acetaminophen 1000mg PO Q8 hours PRN pain    Ondansetron 4 mg PO Q6 hours PRN N/V    Nicotine patch 7, 14, 21 mg  PRN nicotine withdrawal   Trazodone 50 mg PO QHS PRN sleep    Loperamide 4 mg PO PRN diarrhea up to 16 mg/day       The risks, benefits and mechanism of buprenorphine/naloxone were discussed and patient agreed to treatment  Case management consultation will take place to assist with coordination of subsequent treatment after discharge  Administer daily multivitamin  Evaluate and treat for coexisting substance use, such as nicotine  Discuss risk factors for infectious disease, such as history of intravenous drug abuse, and offer hepatitis and HIV screening if indicated  Hx and PE limited by: n/a    HPI: Hilton Perry is a 32y o  year old male PMH chronic hep C, IVDU, opioid use disorder on suboxone, benzodiazepine use disorder, anxiety/depression who presents seeking detox from heroin and benzos  Patient reports that he initially starting using opioid in his teens, starting with percocets; eventually transitioned to snorting heroin at age 24 and injecting heroin at age 22  Currently injects 2 bundles daily  Notes he only injects in bilateral AC fossae, denies injecting elsewhere including hands, feet, neck  Notes he reuses his own needles, however does not share needles with others  Patient has history of hepatitis C, previously prescribed Mavyret however notes he never started treatment  Patient states he has been to rehab in past for opioid use, noting he was previously on methadone in 2010  States he was in rehab at [facility] a little over 6 months ago  Patient currently prescribed maintenance suboxone 8 mg BID through [facility], notes he has been going there bi-weekly for a little over 1 month  Reports he previously went to [facility] x 3 months   Reports he has not taken suboxone in > 1 week  Also reports history of benzodiazepine use since his teens, reporting "off and on use" since age 25  Currently uses 2 mg klonopin every 1-2 days x 6 months, last use 1 5 days ago  States he gets supply off the streets  Denies h/o benzodiazepine withdrawal seizure  Notes h/o anxiety and depression, states he currently does not follow outpatient psychiatry  Prescribed lexapro 10 mg daily, however has been non-compliant recently  Reports recent death of his grandfather has triggered worsening depression and increase in substance use  He notes he is motivated to make a change and pursue rehab  Opioids currently used: heroin and fentanyl  Route of use: intravenous  Date/Time of Last Opioid Use: sometime afternoon of 12/23/2021; 2 bundles   Current Signs/Symptoms of Opioid Withdrawal: anxiety    COWS score:   Clinical Opiate Withdrawal Scale  Pulse: 60    Methadone & Buprenorphine History  History of prior treatment for opioid dependence? yes  Currently on Methadone Maintenance? No; however was prescribed methadone in past (around 2010)  History of prior treatment with Suboxone? yes  Currently taking Suboxone? yes  History of using Suboxone without having a prescription? no  History of IVDA? yes  Co-existing substance use?  Yes- injects cocaine; denies use of meth, denies alcohol consumption    Review of PDMP: yes  12/22/2021  2  12/22/2021  BUPRENORPHINE-NALOX 8-2MG FILM  28 0  14  CARMELA ROSS  [id number]  [facility] [address] [facility])  0  16 0 mg  [place]    12/11/2021  2  12/11/2021  BUPRENORPHINE-NALOX 8-2MG FILM  22 0  11  CARMELA ROSS  [id number]  [facility] [address] (1925)  0  16 0 mg  [place]    12/08/2021  2  12/08/2021  BUPRENORPHINE-NALOX 8-2MG FILM  6 0  3  [facility]  [id number]  [facility] [address] ([id number])  0  16 0 mg  [place]        Social History     Substance and Sexual Activity   Alcohol Use Yes    Comment: socially     Social History     Substance and Sexual Activity   Drug Use Yes    Types: Benzodiazepines, Heroin     Social History     Tobacco Use   Smoking Status Current Every Day Smoker    Packs/day: 1 00   Smokeless Tobacco Never Used       Review of Systems   Constitutional: Negative for appetite change, chills and fever  HENT: Negative for congestion, ear pain, rhinorrhea, sneezing and sore throat  Eyes: Negative for pain and visual disturbance  Respiratory: Negative for cough and shortness of breath  Cardiovascular: Negative for chest pain and palpitations  Gastrointestinal: Negative for abdominal pain, constipation, diarrhea, nausea and vomiting  Genitourinary: Negative for difficulty urinating, dysuria and hematuria  Musculoskeletal: Negative for arthralgias and back pain  Skin: Negative for color change and rash  Neurological: Negative for dizziness, tremors, seizures, syncope, weakness, light-headedness and headaches  Psychiatric/Behavioral: The patient is nervous/anxious  All other systems reviewed and are negative  Historical Information   Past Medical History:   Diagnosis Date    Hepatitis C      Past Surgical History:   Procedure Laterality Date    TONSILLECTOMY       History reviewed  No pertinent family history    Social History   Marital Status: Legally    Occupation: unemployed  Patient Pre-hospital Living Situation: "couch surfing"  Patient Pre-hospital Level of Mobility: independent   Patient Pre-hospital Diet Restrictions: none     No Known Allergies    Prior to Admission medications    Not on File       Current Facility-Administered Medications   Medication Dose Route Frequency    aluminum-magnesium hydroxide-simethicone (MYLANTA) oral suspension 30 mL  30 mL Oral Q4H PRN    cloNIDine (CATAPRES) tablet 0 1 mg  0 1 mg Oral Q6H PRN    escitalopram (LEXAPRO) tablet 10 mg  10 mg Oral Daily    gabapentin (NEURONTIN) capsule 300 mg  300 mg Oral Q8H PRN    multivitamin-minerals (CENTRUM) tablet 1 tablet  1 tablet Oral Daily    nicotine (NICODERM CQ) 14 mg/24hr TD 24 hr patch 1 patch  1 patch Transdermal Daily    ondansetron (ZOFRAN) injection 4 mg  4 mg Intravenous Q6H PRN    sodium chloride 0 9 % infusion  125 mL/hr Intravenous Continuous       Continuous Infusions:sodium chloride, 125 mL/hr, Last Rate: 125 mL/hr (12/24/21 0447)             Objective       Intake/Output Summary (Last 24 hours) at 12/24/2021 0822  Last data filed at 12/24/2021 0706  Gross per 24 hour   Intake 1560 ml   Output --   Net 1560 ml       Invasive Devices:   Peripheral IV 12/24/21 Right Forearm (Active)   Site Assessment WDL 12/24/21 0218   Dressing Type Transparent;Securing device 12/24/21 0218   Line Status Saline locked 12/24/21 0323   Dressing Status Clean;Dry; Intact 12/24/21 0218       Vitals   Vitals:    12/24/21 0406 12/24/21 0419 12/24/21 0505 12/24/21 0706   BP: (!) 87/48 (!) 84/62 95/50 (!) 85/40   TempSrc: Temporal  Temporal Temporal   Pulse: 63  66 60   Resp: 16  16 16   Patient Position - Orthostatic VS: Lying Lying Lying Lying   Temp: 98 5 °F (36 9 °C)  99 1 °F (37 3 °C) 97 8 °F (36 6 °C)       Physical Exam  Vitals reviewed  Constitutional:       General: He is not in acute distress  Appearance: Normal appearance  He is normal weight  He is not ill-appearing or diaphoretic  HENT:      Head: Normocephalic and atraumatic  Nose: Nose normal  No congestion  Mouth/Throat:      Mouth: Mucous membranes are moist       Dentition: Abnormal dentition  Pharynx: Oropharynx is clear  Eyes:      General: No scleral icterus  Extraocular Movements: Extraocular movements intact  Right eye: No nystagmus  Left eye: No nystagmus  Conjunctiva/sclera: Conjunctivae normal       Pupils: Pupils are equal, round, and reactive to light  Cardiovascular:      Rate and Rhythm: Normal rate and regular rhythm  Pulses: Normal pulses  Heart sounds: Normal heart sounds  No murmur heard  No friction rub  No gallop      Pulmonary:      Effort: Pulmonary effort is normal  No respiratory distress  Breath sounds: Normal breath sounds  No wheezing, rhonchi or rales  Abdominal:      General: Abdomen is flat  Bowel sounds are normal  There is no distension  Palpations: Abdomen is soft  Tenderness: There is no abdominal tenderness  There is no guarding  Musculoskeletal:         General: No swelling  Normal range of motion  Cervical back: Normal range of motion  Right lower leg: No edema  Left lower leg: No edema  Skin:     General: Skin is warm and dry  Capillary Refill: Capillary refill takes less than 2 seconds  Findings: No abscess  Neurological:      General: No focal deficit present  Mental Status: He is alert and oriented to person, place, and time  Mental status is at baseline  Sensory: No sensory deficit  Psychiatric:         Attention and Perception: Attention normal          Mood and Affect: Mood is anxious  Speech: Speech normal          Behavior: Behavior is cooperative  DATA    EKG, Pathology, and Other Studies: I have personally reviewed pertinent reports  · EKG (12/24/2021): normal sinus rhythm, normal ECG  QTc 398 ms  No previous ECGs available  (Confirmed by Versa July)    Lab Results: I have personally reviewed pertinent reports          CBC ETOH     Lab Results   Component Value Date    WBC 9 30 12/24/2021    RBC 4 01 (L) 12/24/2021    HGB 12 5 (L) 12/24/2021    HCT 35 8 (L) 12/24/2021    MCV 89 12/24/2021    MCH 31 2 12/24/2021    MCHC 34 9 12/24/2021    RDW 13 5 12/24/2021     12/24/2021    MPV 7 1 (L) 12/24/2021      No results found for: LACTICACID   CMP UA         Component Value Date/Time    K 4 4 12/24/2021 0221     12/24/2021 0221    CO2 29 12/24/2021 0221    BUN 18 12/24/2021 0221    CREATININE 1 12 12/24/2021 0221         Component Value Date/Time    CALCIUM 8 8 12/24/2021 0221    ALKPHOS 68 12/24/2021 0221    AST 67 (H) 12/24/2021 0221    ALT 54 (H) 12/24/2021 0221      No results found for: CLARITYU, COLORU, SPECGRAV, PHUR, GLUCOSEU, KETONESU, BLOODU, PROTEIN UA, NITRITE, BILIRUBINUR, UROBILINOGEN, LEUKOCYTESUR, WBCUA, RBCUA, HYALINE, BACTERIA, EPIS     Liver Function Test: ASA     Lab Results   Component Value Date    TBILI 0 48 12/24/2021    ALKPHOS 68 12/24/2021    AST 67 (H) 12/24/2021    ALT 54 (H) 12/24/2021    TP 6 7 12/24/2021    ALB 3 6 12/24/2021      No results found for: SALICYLATE   Troponin APAP     No results found for: TROPONINI   No results found for: ACTMNPHEN   VBG HCG     No results found for: PHVEN, FJU3ZPE, PO2VEN, SSP7PCE, BEVEN, Q3RCPAJGO, N2CQUSR   No results found for: HCGQUANT   ABG Urine Drug Screen     No results found for: PHART, PFO5EMM, PO2ART, FDX3RJL, BEART, T1PDYUPAB, O2HGB, SOURC, RUTH, VTAC, ACRATE, INSPIREDAIR, PEEP   Lab Results   Component Value Date    AMPMETHUR Negative 12/24/2021    BARBTUR Negative 12/24/2021    BDZUR Negative 12/24/2021    COCAINEUR Positive (A) 12/24/2021    METHADONEUR Negative 12/24/2021    OPIATEUR Positive (A) 12/24/2021    PCPUR Negative 12/24/2021    THCUR Negative 12/24/2021    OXYCODONEUR Negative 12/24/2021      Lactate INR     No results found for: LACTICACID   No results found for: INR   PTT Protime     No results found for: PTT   No results found for: PROTIME   Hepatitis HIV     No results found for: HEPBSAG, HEPCAB   No results found for: [id number], [id number]         Imaging Studies: I have personally reviewed pertinent reports  Counseling / Coordination of Care  Total floor / unit time spent today 70 minutes  Greater than 50% of total time was spent with the patient and / or family counseling and / or coordination of care         Minutes of critical care time 25  -Critical care time was exclusive of separately billable procedures and teaching time    -Critical care was necessary to treat or prevent imminent or life-threatening deterioration of the following condition: CNS failure/compromise, toxidrome (opioid withdrawal), withdrawal  -Critical care time was spent personally by me on the following activities as well as the above as per the course and rest of chart: obtaining history from patient/surrogate, development of a treatment plan, discussions with referring provider(s), evaluation of patient's response to the treatment, examination of the patient, recommending treatments and interventions, re-evaluation of the patient's condition, review of old charts, ordering/interpreting laboratory studies, ordering/interpreting of radiographic studies  ** Please Note: This note has been constructed using a voice recognition system   **

## 2021-12-24 NOTE — ED PROVIDER NOTES
History  Chief Complaint   Patient presents with    Detox Evaluation     Patient wants detox, seen earlier yesterday and left, is returning again for detox  Did heroin 1 hour after he left her yesterday  79-year-old male with history of IV heroin abuse benzo using cocaine use presents requesting detox  Patient was here earlier today however lobe due to a family emergency  Patient now returned of states that he last used IV heroin 1 hour or go and would like inpatient detox  Patient was prescribed Suboxone but is no longer taking since he relapsed  Denies SI or HI  Denies any medical complaints  History provided by:  Patient   used: No        None       Past Medical History:   Diagnosis Date    Hepatitis C        Past Surgical History:   Procedure Laterality Date    TONSILLECTOMY         History reviewed  No pertinent family history  I have reviewed and agree with the history as documented  E-Cigarette/Vaping     E-Cigarette/Vaping Substances    Nicotine No     THC No     CBD No     Flavoring No     Other No     Unknown No      Social History     Tobacco Use    Smoking status: Current Every Day Smoker     Packs/day: 1 00    Smokeless tobacco: Never Used   Vaping Use    Vaping Use: Not on file   Substance Use Topics    Alcohol use: Yes     Comment: socially    Drug use: Yes     Types: Benzodiazepines, Heroin       Review of Systems   Constitutional: Negative  Negative for chills and fatigue  HENT: Negative for ear pain and sore throat  Eyes: Negative for photophobia and redness  Respiratory: Negative for apnea, cough and shortness of breath  Cardiovascular: Negative for chest pain  Gastrointestinal: Negative for abdominal pain, nausea and vomiting  Genitourinary: Negative for dysuria  Musculoskeletal: Negative for arthralgias, neck pain and neck stiffness  Skin: Negative for rash     Neurological: Negative for dizziness, tremors, syncope and weakness  Psychiatric/Behavioral: Negative for suicidal ideas  Physical Exam  Physical Exam  Constitutional:       General: He is not in acute distress  Appearance: He is well-developed  He is not diaphoretic  Eyes:      Pupils: Pupils are equal, round, and reactive to light  Cardiovascular:      Rate and Rhythm: Normal rate and regular rhythm  Pulmonary:      Effort: Pulmonary effort is normal  No respiratory distress  Breath sounds: Normal breath sounds  Abdominal:      General: Bowel sounds are normal  There is no distension  Palpations: Abdomen is soft  Musculoskeletal:         General: Normal range of motion  Cervical back: Normal range of motion and neck supple  Skin:     General: Skin is warm and dry  Comments: Multiple lesions on face and arms  IV track marks noted    Neurological:      Mental Status: He is alert and oriented to person, place, and time           Vital Signs  ED Triage Vitals [12/24/21 0129]   Temperature Pulse Respirations Blood Pressure SpO2   97 6 °F (36 4 °C) 85 16 118/58 95 %      Temp Source Heart Rate Source Patient Position - Orthostatic VS BP Location FiO2 (%)   Tympanic Monitor Sitting Left arm --      Pain Score       No Pain           Vitals:    12/24/21 0129 12/24/21 0406 12/24/21 0419   BP: 118/58 (!) 87/48 (!) 84/62   Pulse: 85 63    Patient Position - Orthostatic VS: Sitting Lying Lying         Visual Acuity      ED Medications  Medications   sodium chloride 0 9 % infusion (has no administration in time range)   nicotine (NICODERM CQ) 14 mg/24hr TD 24 hr patch 1 patch (has no administration in time range)   gabapentin (NEURONTIN) capsule 300 mg (has no administration in time range)   cloNIDine (CATAPRES) tablet 0 1 mg (has no administration in time range)   multivitamin-minerals (CENTRUM) tablet 1 tablet (has no administration in time range)   ondansetron (ZOFRAN) injection 4 mg (has no administration in time range)   ondansetron [facility] injection 4 mg (4 mg Intravenous Given 12/24/21 0219)   sodium chloride 0 9 % bolus 1,000 mL (0 mL Intravenous Stopped 12/24/21 0323)       Diagnostic Studies  Results Reviewed     Procedure Component Value Units Date/Time    COVID/FLU/RSV [[id number]]  (Normal) Collected: 12/24/21 0221    Lab Status: Final result Specimen: Nares from Nasopharyngeal Swab Updated: 12/24/21 0309     SARS-CoV-2 Negative     INFLUENZA A PCR Negative     INFLUENZA B PCR Negative     RSV PCR Negative    Narrative:      FOR PEDIATRIC PATIENTS - copy/paste COVID Guidelines URL to browser: [email or web address]  [email or web address]     This test has been authorized by FDA under an EUA (Emergency Use Assay) for use by authorized laboratories  Clinical caution and judgement should be used with the interpretation of these results with consideration of the clinical impression and other laboratory testing  Testing reported as "Positive" or "Negative" has been proven to be accurate according to standard laboratory validation requirements  All testing is performed with control materials showing appropriate reactivity at standard intervals      Comprehensive metabolic panel [[id number]]  (Abnormal) Collected: 12/24/21 0221    Lab Status: Final result Specimen: Blood from Arm, Right Updated: 12/24/21 0257     Sodium 133 mmol/L      Potassium 4 4 mmol/L      Chloride 103 mmol/L      CO2 29 mmol/L      ANION GAP 1 mmol/L      BUN 18 mg/dL      Creatinine 1 12 mg/dL      Glucose 117 mg/dL      Calcium 8 8 mg/dL      AST 67 U/L      ALT 54 U/L      Alkaline Phosphatase 68 U/L      Total Protein 6 7 g/dL      Albumin 3 6 g/dL      Total Bilirubin 0 48 mg/dL      eGFR 87 ml/min/1 73sq m     Narrative:      Laura guidelines for Chronic Kidney Disease (CKD):     Stage 1 with normal or high GFR (GFR > 90 mL/min/1 73 square meters)    Stage 2 Mild CKD (GFR = 60-89 mL/min/1 73 square meters)    Stage 3A Moderate CKD (GFR = 45-59 mL/min/1 73 square meters)    Stage 3B Moderate CKD (GFR = 30-44 mL/min/1 73 square meters)    Stage 4 Severe CKD (GFR = 15-29 mL/min/1 73 square meters)    Stage 5 End Stage CKD (GFR <15 mL/min/1 73 square meters)  Note: GFR calculation is accurate only with a steady state creatinine    Rapid drug screen, urine [[id number]]  (Abnormal) Collected: 12/24/21 0208    Lab Status: Final result Specimen: Urine, Catheter Updated: 12/24/21 0247     Amph/Meth UR Negative     Barbiturate Ur Negative     Benzodiazepine Urine Negative     Cocaine Urine Positive     Methadone Urine Negative     Opiate Urine Positive     PCP Ur Negative     THC Urine Negative     Oxycodone Urine Negative    Narrative:      Presumptive report  If requested, specimen will be sent to reference lab for confirmation  FOR MEDICAL PURPOSES ONLY  IF CONFIRMATION NEEDED PLEASE CONTACT THE LAB WITHIN 5 DAYS      Drug Screen Cutoff Levels:  AMPHETAMINE/METHAMPHETAMINES  1000 ng/mL  BARBITURATES     200 ng/mL  BENZODIAZEPINES     200 ng/mL  COCAINE      300 ng/mL  METHADONE      300 ng/mL  OPIATES      300 ng/mL  PHENCYCLIDINE     25 ng/mL  THC       50 ng/mL  OXYCODONE      100 ng/mL    Manual Differential (Non Wam) [[id number]]  (Abnormal) Collected: 12/24/21 0221    Lab Status: Final result Specimen: Blood from Arm, Right Updated: 12/24/21 0236     Segmented % 52 %      Lymphocytes % 33 %      Monocytes % 12 %      Eosinophils, % 3 %      Neutrophils Absolute 4 84 Thousand/uL      Lymphocytes Absolute 3 07 Thousand/uL      Monocytes Absolute 1 12 Thousand/uL      Eosinophils Absolute 0 28 Thousand/uL      Total Counted 100     RBC Morphology Normal     Platelet Estimate Adequate    CBC and differential [[id number]]  (Abnormal) Collected: 12/24/21 0221    Lab Status: Final result Specimen: Blood from Arm, Right Updated: 12/24/21 0233     WBC 9 30 Thousand/uL      RBC 4 01 Million/uL      Hemoglobin 12 5 g/dL Hematocrit 35 8 %      MCV 89 fL      MCH 31 2 pg      MCHC 34 9 g/dL      RDW 13 5 %      MPV 7 1 fL      Platelets 485 Thousands/uL                  No orders to display              Procedures  ECG 12 Lead Documentation Only    Date/Time: 12/24/2021 2:10 AM  Performed by: Jose Winn PA-C  Authorized by: Jose Winn PA-C     Indications / Diagnosis:  Screening   ECG reviewed by me, the ED Provider: yes    Patient location:  ED  Interpretation:     Interpretation: normal    Rate:     ECG rate:  64    ECG rate assessment: normal    Rhythm:     Rhythm: sinus rhythm    Ectopy:     Ectopy: none    QRS:     QRS axis:  Normal    QRS intervals:  Normal  Conduction:     Conduction: normal    ST segments:     ST segments:  Normal  T waves:     T waves: normal               ED Course                               SBIRT 20yo+      Most Recent Value   SBIRT (24 yo +)    In order to provide better care to our patients, we are screening all of our patients for alcohol and drug use  Would it be okay to ask you these screening questions? Unable to answer at this time Filed at: 12/24/2021 0144                    MDM  Number of Diagnoses or Management Options  Diagnosis management comments: Patient requesting detox for benzodiazepines and heroin  Willing to go on Suboxone  Tells me that he typically gets withdrawal symptoms within 24 hours and last use 1 hour ago  Discussed with detox PA who agrees to admit to the floor    Patient medically stable for admission to detox       Amount and/or Complexity of Data Reviewed  Clinical lab tests: ordered and reviewed    Risk of Complications, Morbidity, and/or Mortality  Presenting problems: moderate  Diagnostic procedures: moderate  Management options: moderate    Patient Progress  Patient progress: stable      Disposition  Final diagnoses:   Drug withdrawal ([facility] [address] )     Time reflects when diagnosis was documented in both MDM as applicable and the Disposition within this note     Time User Action Codes Description Comment    12/24/2021  4:28 AM Deepa Rivero Add [Q35 256] Drug withdrawal [facility])       ED Disposition     ED Disposition Condition Date/Time Comment    Admit Stable Fri Dec 24, 2021  2:58 AM Case was discussed with Dino FERREIRA and the patient's admission status was agreed to be Admission Status: inpatient status to the service of Dr Angela Lai   Follow-up Information     Follow up With Specialties Details Why Contact Info    Ivonne Sal MD Family Medicine Call  As needed [address] [id number]  [phone]            There are no discharge medications for this patient  No discharge procedures on file      PDMP Review     None          ED Provider  Electronically Signed by           Yosvany Galo PA-C  12/24/21 2350

## 2021-12-24 NOTE — PLAN OF CARE
Problem: MOBILITY - ADULT  Goal: Maintain or return to baseline ADL function  Description: INTERVENTIONS:  -  Assess patient's ability to carry out ADLs; assess patient's baseline for ADL function and identify physical deficits which impact ability to perform ADLs (bathing, care of mouth/teeth, toileting, grooming, dressing, etc )  - Assess/evaluate cause of self-care deficits   - Assess range of motion  - Assess patient's mobility; develop plan if impaired  - Assess patient's need for assistive devices and provide as appropriate  - Encourage maximum independence but intervene and supervise when necessary  - Involve family in performance of ADLs  - Assess for home care needs following discharge   - Consider OT consult to assist with ADL evaluation and planning for discharge  - Provide patient education as appropriate  Outcome: Not Progressing  Goal: Maintains/Returns to pre admission functional level  Description: INTERVENTIONS:  - Perform BMAT or MOVE assessment daily    - Set and communicate daily mobility goal to care team and patient/family/caregiver  - Collaborate with rehabilitation services on mobility goals if consulted  - Perform Range of Motion 2 times a day    - Ambulate patient 2 times a day  - Out of bed to chair 2 times a day   - Out of bed for meals 2 times a day  - Out of bed for toileting  - Record patient progress and toleration of activity level   Outcome: Not Progressing     Problem: Potential for Falls  Goal: Patient will remain free of falls  Description: INTERVENTIONS:  - Educate patient/family on patient safety including physical limitations  - Instruct patient to call for assistance with activity   - Consult OT/PT to assist with strengthening/mobility   - Keep Call bell within reach  - Keep bed low and locked with side rails adjusted as appropriate  - Keep care items and personal belongings within reach  - Initiate and maintain comfort rounds  - Make Fall Risk Sign visible to staff  - Offer Toileting every 2 Hours, in advance of need  - Obtain necessary fall risk management equipment: socks  - Apply yellow socks and bracelet for high fall risk patients  - Consider moving patient to room near nurses station  Outcome: Not Progressing     Problem: PAIN - ADULT  Goal: Verbalizes/displays adequate comfort level or baseline comfort level  Description: Interventions:  - Encourage patient to monitor pain and request assistance  - Assess pain using appropriate pain scale  - Administer analgesics based on type and severity of pain and evaluate response  - Implement non-pharmacological measures as appropriate and evaluate response  - Consider cultural and social influences on pain and pain management  - Notify physician/advanced practitioner if interventions unsuccessful or patient reports new pain  Outcome: Not Progressing     Problem: INFECTION - ADULT  Goal: Absence or prevention of progression during hospitalization  Description: INTERVENTIONS:  - Assess and monitor for signs and symptoms of infection  - Monitor lab/diagnostic results  - Monitor all insertion sites, i e  indwelling lines, tubes, and drains  - Monitor endotracheal if appropriate and nasal secretions for changes in amount and color  - [place] appropriate cooling/warming therapies per order  - Administer medications as ordered  - Instruct and encourage patient and family to use good hand hygiene technique  - Identify and instruct in appropriate isolation precautions for identified infection/condition  Outcome: Not Progressing  Goal: Absence of fever/infection during neutropenic period  Description: INTERVENTIONS:  - Monitor WBC    Outcome: Not Progressing     Problem: SAFETY ADULT  Goal: Maintain or return to baseline ADL function  Description: INTERVENTIONS:  -  Assess patient's ability to carry out ADLs; assess patient's baseline for ADL function and identify physical deficits which impact ability to perform ADLs (bathing, care of mouth/teeth, toileting, grooming, dressing, etc )  - Assess/evaluate cause of self-care deficits   - Assess range of motion  - Assess patient's mobility; develop plan if impaired  - Assess patient's need for assistive devices and provide as appropriate  - Encourage maximum independence but intervene and supervise when necessary  - Involve family in performance of ADLs  - Assess for home care needs following discharge   - Consider OT consult to assist with ADL evaluation and planning for discharge  - Provide patient education as appropriate  Outcome: Not Progressing  Goal: Maintains/Returns to pre admission functional level  Description: INTERVENTIONS:  - Perform BMAT or MOVE assessment daily    - Set and communicate daily mobility goal to care team and patient/family/caregiver  - Collaborate with rehabilitation services on mobility goals if consulted  - Perform Range of Motion 2 times a day  - Reposition patient every 2 hours    - Dangle patient 2 times a day  - Stand patient 2 times a day  - Ambulate patient 2 times a day  - Out of bed to chair 2 times a day   - Out of bed for meals 22 times a day  - Out of bed for toileting  - Record patient progress and toleration of activity level   Outcome: Not Progressing  Goal: Patient will remain free of falls  Description: INTERVENTIONS:  - Educate patient/family on patient safety including physical limitations  - Instruct patient to call for assistance with activity   - Consult OT/PT to assist with strengthening/mobility   - Keep Call bell within reach  - Keep bed low and locked with side rails adjusted as appropriate  - Keep care items and personal belongings within reach  - Initiate and maintain comfort rounds  - Make Fall Risk Sign visible to staff  - Offer Toileting every 2 Hours, in advance of need  - Obtain necessary fall risk management equipment: yellow socks  - Apply yellow socks and bracelet for high fall risk patients  - Consider moving patient to room near nurses station  Outcome: Not Progressing     Problem: DISCHARGE PLANNING  Goal: Discharge to home or other facility with appropriate resources  Description: INTERVENTIONS:  - Identify barriers to discharge w/patient and caregiver  - Arrange for needed discharge resources and transportation as appropriate  - Identify discharge learning needs (meds, wound care, etc )  - Arrange for interpretive services to assist at discharge as needed  - Refer to Case Management Department for coordinating discharge planning if the patient needs post-hospital services based on physician/advanced practitioner order or complex needs related to functional status, cognitive ability, or social support system  Outcome: Not Progressing

## 2021-12-24 NOTE — ASSESSMENT & PLAN NOTE
· Patient with h/o hep c in setting of IVDU  · Hepatitis C quantitative PCR 7/20/2021: [phone]  · Patient states he was previously prescribed treatment ([address]) however states he never took it  · IVDU is ongoing  · CMP 12/24/2021:  LFTs AST 67, ALT 54, alk-phos 68   T bili 0 48  · Encourage cessation of IVDU  · Recommend outpatient GI follow-up for ongoing monitoring/maintenance

## 2021-12-24 NOTE — ASSESSMENT & PLAN NOTE
· Currently injecting 2 bundles heroin daily x 6 months   · Last use afternoon of 12/23/2021  · Follow COWS/MAT protocol for medically-assisted opioid withdrawal  · Transition to COWS once SEWS protocol complete  · Order adjunct symptomatic treatment

## 2021-12-24 NOTE — DISCHARGE SUMMARY
MEDICAL DETOX UNIT, LEVEL 4  Department of Medical Toxicology  Reason for Admission/Principal Problem: benzodiazepine withdrawal, opioid withdrawal, polysubstance use disorder    Admitting provider: Naomie Solis MD  No att  providers found   12/24/2021  1:28 AM       Discharging Physician / Practitioner: Naomie Solis MD  PCP: Hillary Ashley MD  Admission Date:   Admission Orders (From admission, onward)     Ordered        12/24/21 0259  Inpatient Admission  Once                      Discharge Date: 12/24/21    Medical Problems             Resolved Problems  Date Reviewed: 12/24/2021    None                Moderate benzodiazepine use disorder ([address] )  Assessment & Plan  · Reports history of benzodiazepine use since teens  · Currently uses 2 mg klonopin every 1-2 days, last use > 2 days ago     Tobacco dependence due to cigarettes  Assessment & Plan  Current every day smoker: 1 ppd  Encourage cessation  Offer nicotine replacement    Anxiety and depression  Assessment & Plan  · Currently does not follow with outpatient psychiatry  · States he is prescribed lexapro 10 mg daily, but has not been taking recently  · Also notes he occasionally takes seroquel 50 mg at bedtime PRN  · Reports recent worsening of symptoms 2/2 grandfather's recent passing as well as subsequent increase in substance use as coping mechanism  · Currently denies SIs/HIs  · Continue lexapro   · Consult psychiatry, appreciate recommendations      IVDU (intravenous drug user)  Assessment & Plan  · Reports current IV drug user- heroin/fentanyl and cocaine  · States he will reuse needles, denies sharing needles with others  · Reports he injects only in b/l AC fossae  · Track marks visible b/l AC fossa, no evidence of abscess/acute infection currently   · H/o of + hep C, untreated   · Recommend outpatient follow-up with GI for further management of hep C  · UDS 12/24/2021: + cocaine, opiates  · Check HIV, acute hepatitis panel  · Encourage cessation    Chronic hepatitis C without hepatic coma ([facility] )  Assessment & Plan  · Patient with h/o hep c in setting of IVDU  · Hepatitis C quantitative PCR 7/20/2021: 389,266  · Patient states he was previously prescribed treatment (Adrianna Mirza) however states he never took it  · IVDU is ongoing  · CMP 12/24/2021:  LFTs AST 67, ALT 54, alk-phos 68  T bili 0 48  · Encourage cessation of IVDU  · Recommend outpatient GI follow-up for ongoing monitoring/maintenance    Benzodiazepine withdrawal without complication ([facility] )  Assessment & Plan  · H/o benzo use since teens ("off and on since age 25"), denies h/o withdrawal seizures  · Currently uses 2 mg klonopin every 1-2 days x 6 months, last use 1 5 days ago    Phenobarbital was held overnight due to low BP   SEWS protocol was continued as BP improved, however, patient requested to leave the unit to go to [facility] in [place]  Patient received no phenobarbital in the unit and very abruptly said that his mother was picking him up in 10 minutes, then left without waiting for escort downstairs or for full discharge instructions  Opioid use disorder, severe, dependence ([facility] )  Assessment & Plan  · Patient reports h/o opioid abuse since his teens   · Initially started with percocets at age 12, started snorting heroin at age 24, and injecting IV heroin at age 22  · Currently injecting 2 bundles heroin daily x 6 months   · Currently prescribed suboxone 8 mg BID via [facility]  · [address] reviewed- most recent script 12/22/2021: suboxone 8-2 mg (28 tabs, 14 days)  · Reports he has not used suboxone in >1 week  · Follow COWS/MAT protocol for medically-assisted opioid withdrawal  · Transition to COWS once SEWS protocol complete  · Consult case management for assistance with rehab resources    --> Reportedly, mother has secured the patient a bed a [facility] in [place], and patient no longer wishes to stay in this detox unit      * Opioid withdrawal ([facility] )  Assessment & Plan  · Patient abruptly left the unit to go to [facility] in [place] with his mother  MAT was not started  Consultations During Hospital Stay:  · Psychiatry    Procedures Performed:   · none    Significant Findings / Test Results:   · See above    Incidental Findings:   · none     Test Results Pending at Discharge (will require follow up): · Hepatitis Panel  · HIV testing     Outpatient Tests Requested:  · none    Complications:  none    Reason for Admission: benzodiazepine and opioid detox/withdrawal    Hospital Course:     Hayder Fenton is a 32 y o  male patient who originally presented to the hospital on 12/24/2021 due to desire for detox from benzodiazpines and opioids  Patient last used benzos and narcotics > 2 days ago  Patient was admitted for detox and was started on SEWS, however, overnight, patient became hypotensive 80s/60s with bradycardia 50s  Phenobarbital was held  This AM, patient was given 2 L NS bolus with improvement in BP  This afternoon, patient abruptly states that he had a bed at [facility] in [place] and that his mom was coming to pick him up  Patient then left the unit without escort  Phenobarbital was not given  At the time of my evaluation, patient was fully oriented, without suicidal or homicidal ideations  The patient had capacity to decide to leave without medical treatment  An AMA was not able to be obtained as the patient walked out of the unit  Please see above list of diagnoses and related plan for additional information       Condition at Discharge: good     Discharge Day Visit / Exam:     Subjective:  Mild GI symptoms including cramping    Vitals: Blood Pressure: 136/50 (12/24/21 1500)  Pulse: 57 (12/24/21 1500)  Temperature: 97 6 °F (36 4 °C) (12/24/21 0800)  Temp Source: Oral (12/24/21 0800)  Respirations: 16 (12/24/21 1500)  Height: 5' 7" (170 2 cm) (12/24/21 0406)  Weight - Scale: 69 2 kg (152 lb 9 6 oz) (12/24/21 0406)  SpO2: 99 % (12/24/21 1500)    Discussion with Family: none Discharge instructions/Information to patient and family:   See after visit summary for information provided to patient and family  Provisions for Follow-Up Care:  See after visit summary for information related to follow-up care and any pertinent home health orders  Disposition:     Home    For Discharges to [facility] SNF:   · Not Applicable to this Patient - Not Applicable to this Patient    Planned Readmission: no     Discharge Statement:  I spent 25 minutes discharging the patient  This time was spent on the day of discharge  I had direct contact with the patient on the day of discharge  Greater than 50% of the total time was spent examining patient, answering all patient questions, arranging and discussing plan of care with patient as well as directly providing post-discharge instructions  Additional time then spent on discharge activities  Discharge Medications:  See after visit summary for reconciled discharge medications provided to patient and family        ** Please Note: This note has been constructed using a voice recognition system **

## 2021-12-24 NOTE — ASSESSMENT & PLAN NOTE
· Reports current IV drug user- heroin/fentanyl and cocaine  · States he will reuse needles, denies sharing needles with others  · Reports he injects only in b/l AC fossae  · Track marks visible b/l AC fossa, no evidence of abscess/acute infection currently   · H/o of + hep C, untreated   · Recommend outpatient follow-up with GI for further management of hep C  · UDS 12/24/2021: + cocaine, opiates  · Check HIV, acute hepatitis panel  · Encourage cessation

## 2021-12-24 NOTE — ASSESSMENT & PLAN NOTE
· Patient reports h/o opioid abuse since his teens   · Initially started with percocets at age 12, started snorting heroin at age 24, and injecting IV heroin at age 22  · Currently injecting 2 bundles heroin daily x 6 months   · Currently prescribed suboxone 8 mg BID via [facility] in [place]  · [address] reviewed- most recent script 12/22/2021: suboxone 8-2 mg (28 tabs, 14 days)  · Reports he has not used suboxone in >1 week  · Follow COWS/MAT protocol for medically-assisted opioid withdrawal  · Transition to COWS once SEWS protocol complete  · Consult case management for assistance with rehab resources    --> Reportedly, mother has secured the patient a bed a [facility] in [facility] [facility], and patient no longer wishes to stay in this detox unit

## 2021-12-24 NOTE — ASSESSMENT & PLAN NOTE
· Reports history of benzodiazepine use since teens  · Currently uses 2 mg klonopin every 1-2 days, last use > 2 days ago

## 2021-12-24 NOTE — ASSESSMENT & PLAN NOTE
· Patient abruptly left the unit to go to [facility] in [place] with his mother  MAT was not started

## 2021-12-24 NOTE — CONSULTS
Consultation - Behavioral Health     Identification Data: Jordyn Chance 32 y o  male MRN: [id number]  Unit/Bed#: [facility] Encounter: [id number]    12/24/21  12:09 PM    Inpatient consult to Psychiatry  Consult performed by: Sara Dela Cruz PA-C  Consult ordered by: Iwona Serra PA-C        Physician Requesting Consult: Andre Sepulveda DO  Principal Problem:Opioid withdrawal [facility])    Reason for Consult:  "I've been dealing with a lot "    History of Present Illness     Jordyn Chance is a 32 y o  male with a history of depression, anxiety, polysubstance abuse including heroin, benzodiazepines, fentanyl, chronic hep C and IVDU who was admitted to inpatient detox on  12/24/2021 by patient request after recent binge  Psychiatric consultation was requested due to recommendation for medication adjustments    On initial psychiatric evaluation Ana Rosa Metzger was initially lying in in bed, sleeping prior to arrival   He is dressed in hospital attire and was easily awoken  He reports seeking detox from heroin and benzos due to recent relapse in the setting of recent psychosocial stressors including the recent death of his grandfather  Patient does endorse longstanding history of polysubstance abuse starting in his teens which gradually worsened to injecting heroin in his mid 25s  He does report previous rehab stay 6 months ago and since then reported sobriety however recently relapsed due to increased stress at home  Today, patient reports that he is feeling sad and is ruminating over his recent relapse and is hopeful to attend rehab program and later hopefully be transferred to recovery home  He does report symptoms including low mood, low energy, feelings of sadness and worthlessness  He is denying any suicidal thoughts or homicidal thoughts today  No recent episodes of self-harm  Reports his sleep and appetite have been adequate    He does not appear overtly psychotic and denies hearing voices or seeing images both on and off illicit substances  He was questioned in terms of previous gentry history which he does report previous episodes of depressed mood alternating between periods of increased energy and mood He has been following up with his PCP for diagnosis of depression anxiety however has never followed up with Psychiatry in the past   He does report family history significant for depression, anxiety and bipolar disorder  He reports being compliant with his Lexapro for the past 6 months however recently stopped after the death of his grandfather  During this time, he reported no incidents where he felt that he had regressed into a manic episode including increase in risk-taking behavior, goal-directed activities, grandiosity, distractibility, irritability, increased energy with reduced need for sleep, rapid thoughts or pressured speech  He denied any previous history of OCD, PTSD or trauma        Psychiatric Review Of Systems:    sleep changes: no  appetite changes: no  weight changes: no  energy/anergy: decreased  interest/pleasure/anhedonia: no  somatic symptoms: no  anxiety/panic: worrying  gentry: questionable manic episodes in the past  guilty/hopeless: no  self injurious behavior/risky behavior: no  Suicidal ideation: no  Homicidal ideation: no  Auditory hallucinations: no  Visual hallucinations: no  Other hallucinations: no  Delusional thinking: no    Historical Information     Past Psychiatric History:     Past Inpatient Psychiatric Treatment:   No history of past inpatient psychiatric admissions  Past Outpatient Psychiatric Treatment:    No history of past outpatient psychiatric treatment  Was previously following up with his PCP in [place], [place]  Past Suicide Attempts: no  Past Violent Behavior: no  Past Psychiatric Medication Trials: Topamax however reports side effect of compounded anger     Substance Abuse History:    Social History     Tobacco History     Smoking Status  Current Every Day Smoker Smoking Frequency  1 pack/day    Smokeless Tobacco Use  Never Used          Alcohol History     Alcohol Use Status  Yes Comment  socially          Drug Use     Drug Use Status  Yes Types  Benzodiazepines, Heroin          Sexual Activity     Sexually Active  Yes          Activities of Daily Living    Not Asked                 I have assessed this patient for substance use within the past 12 months    Alcohol use: occasional, social use  Recreational drug use:   Cocaine:  current use  Heroin:  current use  Marijuana:  denies current use  Other drugs: klonopin     Family Psychiatric History:     Psychiatric Illness: Mother - depression and anxiety disorder, Sister - bipolar disorder, Grandmother - depression and anxiety disorder  Substance Abuse:  no family history of substance abuse  Suicide Attempts:  no family history of suicide attempts    Social History:    Education: high school diploma/GED  Marital History: single  Children: none  Living Arrangement: The patient has no stable place to live  Occupational History: unknown occupation  Legal History: none      Past Medical History:   Diagnosis Date    Hepatitis C      Past Surgical History:   Procedure Laterality Date    TONSILLECTOMY           Medical Review Of Systems:    Pertinent items are noted in HPI  Allergies:    No Known Allergies    Medications: All current active medications have been reviewed      Objective     Vital signs in last 24 hours:    Temp:  [97 6 °F (36 4 °C)-99 1 °F (37 3 °C)] 97 6 °F (36 4 °C)  HR:  [54-85] 54  Resp:  [16] 16  BP: ()/(40-62) 94/49    Intake/Output Summary (Last 24 hours) at 12/24/2021 1209  Last data filed at 12/24/2021 4516  Gross per 24 hour   Intake 1560 ml   Output --   Net 1560 ml       Mental Status Evaluation:    Appearance:  marginal hygiene, dressed in hospital attire   Behavior:  mildly guarded   Speech:  normal rate, normal volume   Mood:  depressed   Affect:  anxious Language: naming objects   Thought Process:  logical, coherent, goal directed   Associations: intact associations   Thought Content:  normal, no overt delusions   Perceptual Disturbances: none   Risk Potential: Suicidal ideation - None  Homicidal ideation - None  Potential for aggression - No   Sensorium:  oriented to person, place and time/date   Memory:  recent and remote memory grossly intact   Consciousness:  alert and awake    Attention/Concentration: attention span and concentration are age appropriate   Intellect: average   Fund of Knowledge: awareness of current events: normal   Insight:  limited   Judgment: limited   Muscle Strength Muscle Tone: normal  normal   Gait/Station: in bed   Motor Activity: no abnormal movements     Laboratory Results: I have personally reviewed all pertinent laboratory/tests results  Imaging Studies: No results found  Code Status: Level 1 - Full Code  Advance Directive and Living Will:       Power of :      Assessment/Plan     Principal Problem:    Opioid withdrawal ([facility] )  Active Problems:    Opioid use disorder, severe, dependence ([facility] )    Benzodiazepine withdrawal without complication ([facility] )    Chronic hepatitis C without hepatic coma ([facility] )    IVDU (intravenous drug user)    Anxiety and depression    Tobacco dependence due to cigarettes    Moderate benzodiazepine use disorder ([facility] )      Assessment:    Jolene Tejada is a 32 y o  male with a history of depression, anxiety, polysubstance abuse including heroin, benzodiazepines, fentanyl, chronic hep C and IVDU who was requesting inpatient detox admission in the hopes of attending outpatient rehab in the setting of recent binge of cocaine and heroin  Does report previous psych history of depression and anxiety previously managed by SSRI Lexapro however recently became noncompliant and relapsed on illicit substances    He reports decreased mood and sadness due to the recent loss of his grandfather which he is still grieving  He does not feel that he requires inpatient psychiatric treatment and is requesting to be discharged to rehab once medically stable  He was restarted on his Lexapro 10 milligrams p o  daily  We did discuss his previous episodes of mood changes including elevation and was concerned of possible underlying diagnosis of bipolar disorder and induction of gentry with antidepressant medication  However, patient was insistent on continuing his Lexapro since he felt that this was helpful for him in the past        Treatment Plan:     Planned Medication Changes: All current active medications have been reviewed  Encourage group therapy, milieu therapy and occupational therapy  Behavioral Health checks every 7 minutes  Continue Lexapro 10 milligrams p o  Daily  Labs reviewed - would recommend to follow-up with [facility] prior to discharge to monitor for any SSRI induced hyponatremia  Sodium today on 12/24/2021 = 133  Patient is currently not a risk to himself or others and does not require inpatient psychiatric admission at this time; does not meet 302 criteria  Psychiatry will sign off    Please re-consult for any other questions or concerns    Current Facility-Administered Medications   Medication Dose Route Frequency Provider Last Rate    aluminum-magnesium hydroxide-simethicone  30 mL Oral Q4H PRN Venita Defrancisco, PA-C      cloNIDine  0 1 mg Oral Q6H PRN Venita Defrancisco, PA-C      escitalopram  10 mg Oral Daily Venita Defrancisco, PA-C      gabapentin  300 mg Oral Q8H PRN Venita Defrancisco, PA-C      [START ON 12/25/2021] influenza vaccine  0 5 mL Intramuscular Once Paco Walden MD      multivitamin-minerals  1 tablet Oral Daily Venita Deflalacisco, PA-C      nicotine  1 patch Transdermal Daily Venita Defrancisco, PA-C      ondansetron  4 mg Intravenous Q6H PRN Venita Beleno, PA-C      sodium chloride  2,000 mL Intravenous Once Paco Walden MD 2,000 mL (12/24/21 1010)    sodium chloride  125 mL/hr Intravenous Continuous Venita Goetz PA-C 125 mL/hr (12/24/21 1887)       Risks / Benefits of Treatment:    Risks, benefits, and possible side effects of medications explained to patient and patient verbalizes understanding and agreement for treatment  Counseling / Coordination of Care: Total floor / unit time spent today 45 minutes  Greater than 50% of total time was spent with the patient and / or family counseling and / or coordination of care  A description of the counseling / coordination of care:   Patient's presentation on admission and proposed treatment plan discussed with treatment team   Diagnosis, medication changes and treatment plan reviewed with patient       Thank you for allowing Psychiatry to consult on this patient    [place] 12/24/21

## 2021-12-24 NOTE — ASSESSMENT & PLAN NOTE
· Patient with h/o hep c in setting of IVDU  · Hepatitis C quantitative PCR 7/20/2021: 699,090  · Patient states he was previously prescribed treatment (Brandt Baca) however states he never took it  · IVDU is ongoing  · CMP 12/24/2021:  LFTs AST 67, ALT 54, alk-phos 68   T bili 0 48  · Encourage cessation of IVDU  · Recommend outpatient GI follow-up for ongoing monitoring/maintenance

## 2021-12-24 NOTE — ASSESSMENT & PLAN NOTE
· H/o benzo use since teens ("off and on since age 25"), denies h/o withdrawal seizures    · Currently uses 2 mg klonopin every 1-2 days x 6 months, last use 1 5 days ago  · Follow SEWS protocol for medically-assisted benzodiazepine withdrawal  · Initial SEWS 6, however phenobarbital held in light of low BP of 84/62

## 2021-12-24 NOTE — ASSESSMENT & PLAN NOTE
· H/o benzo use since teens ("off and on since age 25"), denies h/o withdrawal seizures  · Currently uses 2 mg klonopin every 1-2 days x 6 months, last use 1 5 days ago    Phenobarbital was held overnight due to low BP   SEWS protocol was continued as BP improved, however, patient requested to leave the unit to go to [facility] in [place]  Patient received no phenobarbital in the unit and very abruptly said that his mother was picking him up in 10 minutes, then left without waiting for escort downstairs or for full discharge instructions

## 2021-12-24 NOTE — PLAN OF CARE
Problem: SUBSTANCE USE/ABUSE  Goal: By discharge, will develop insight into their chemical dependency and sustain motivation to continue in recovery  Description: INTERVENTIONS:  - Attends all daily group sessions and scheduled AA groups  - Actively practices coping skills through participation in the therapeutic community and adherence to program rules  - Reviews and completes assignments from individual treatment plan  - Assist patient development of understanding of their personal cycle of addiction and relapse triggers  Outcome: Progressing  Goal: By discharge, patient will have ongoing treatment plan addressing chemical dependency  Description: INTERVENTIONS:  - Assist patient with resources and/or appointments for ongoing recovery based living  Outcome: Progressing

## 2021-12-25 ENCOUNTER — HOSPITAL ENCOUNTER (EMERGENCY)
Facility: HOSPITAL | Age: 31
Discharge: HOME | End: 2021-12-25
Attending: EMERGENCY MEDICINE
Payer: MEDICARE

## 2021-12-25 VITALS
SYSTOLIC BLOOD PRESSURE: 144 MMHG | HEART RATE: 75 BPM | TEMPERATURE: 97.8 F | WEIGHT: 160.1 LBS | RESPIRATION RATE: 18 BRPM | OXYGEN SATURATION: 97 % | BODY MASS INDEX: 25.08 KG/M2 | DIASTOLIC BLOOD PRESSURE: 82 MMHG

## 2021-12-25 DIAGNOSIS — F11.10 HEROIN ABUSE (HCC): Primary | ICD-10-CM

## 2021-12-25 DIAGNOSIS — R42 LIGHTHEADEDNESS: ICD-10-CM

## 2021-12-25 PROCEDURE — 99284 EMERGENCY DEPT VISIT MOD MDM: CPT

## 2021-12-25 PROCEDURE — 99282 EMERGENCY DEPT VISIT SF MDM: CPT | Performed by: PHYSICIAN ASSISTANT

## 2021-12-25 NOTE — ED NOTES
Pt was cooperative, packed his belongings and thanked us for trying to help    Pt stated he realized that he left too soon yesterday       Dimitri Schirmer, RN  12/25/21 4892

## 2021-12-25 NOTE — ED PROVIDER NOTES
History  Chief Complaint   Patient presents with    Dizziness     he was discharged from Detox, went to mom's house and was not feeling well  60-year-old male with history of substance abuse presents multiple vague complaints  Patient was admitted to detox by myself yesterday and eloped today  I contacted the admitting staff on detox 2 tells me that patient abruptly eloped and refused to sign AMA paperwork or be escorted to the lobby  Patient now returns stating I need my detox bed back patient has a history of multiple stool rope foods of eloping from that hospital   Patient tells me that he feels weak but denies any other complaints  History provided by:  Patient   used: No        None       Past Medical History:   Diagnosis Date    Hepatitis C        Past Surgical History:   Procedure Laterality Date    TONSILLECTOMY         History reviewed  No pertinent family history  I have reviewed and agree with the history as documented  E-Cigarette/Vaping     E-Cigarette/Vaping Substances    Nicotine No     THC No     CBD No     Flavoring No     Other No     Unknown No      Social History     Tobacco Use    Smoking status: Current Every Day Smoker     Packs/day: 1 00    Smokeless tobacco: Never Used   Vaping Use    Vaping Use: Not on file   Substance Use Topics    Alcohol use: Yes     Comment: socially    Drug use: Yes     Types: Benzodiazepines, Heroin       Review of Systems   Constitutional: Negative  Negative for chills and fatigue  HENT: Negative for ear pain and sore throat  Eyes: Negative for photophobia and redness  Respiratory: Negative for apnea, cough and shortness of breath  Cardiovascular: Negative for chest pain  Gastrointestinal: Negative for abdominal pain, nausea and vomiting  Genitourinary: Negative for dysuria  Musculoskeletal: Negative for arthralgias, neck pain and neck stiffness  Skin: Negative for rash     Neurological: Negative for dizziness, tremors, syncope and weakness  Psychiatric/Behavioral: Negative for suicidal ideas  Physical Exam  Physical Exam  Constitutional:       General: He is not in acute distress  Appearance: He is well-developed  He is not diaphoretic  Eyes:      Pupils: Pupils are equal, round, and reactive to light  Cardiovascular:      Rate and Rhythm: Normal rate and regular rhythm  Pulmonary:      Effort: Pulmonary effort is normal  No respiratory distress  Breath sounds: Normal breath sounds  Abdominal:      General: Bowel sounds are normal  There is no distension  Palpations: Abdomen is soft  Musculoskeletal:         General: Normal range of motion  Cervical back: Normal range of motion and neck supple  Skin:     General: Skin is warm and dry  Neurological:      Mental Status: He is alert and oriented to person, place, and time  Vital Signs  ED Triage Vitals [12/25/21 0110]   Temperature Pulse Respirations Blood Pressure SpO2   97 8 °F (36 6 °C) 75 18 144/82 97 %      Temp Source Heart Rate Source Patient Position - Orthostatic VS BP Location FiO2 (%)   Tympanic Monitor -- -- --      Pain Score       --           Vitals:    12/25/21 0110   BP: 144/82   Pulse: 75         Visual Acuity      ED Medications  Medications - No data to display    Diagnostic Studies  Results Reviewed     None                 No orders to display              Procedures  Procedures         ED Course                                             MDM  Number of Diagnoses or Management Options  Heroin abuse ([facility] [address] ): new and does not require workup  Lightheadedness: new and does not require workup  Diagnosis management comments: Discussed with detox PA  Patient abruptly left detox today and would refuse to sign AMA paperwork  Patient immediately use daughter leaving the detox unit    Patient also eloped after getting a host eval   Advised the patient that these resources are no longer available to him since he eloped today  Patient demonstrates understanding and agreeable  Was given outpatient resources and agrees to follow up and return for worsening complaints  Encouraged to return for any worsening symptoms  Risk of Complications, Morbidity, and/or Mortality  Presenting problems: moderate  Diagnostic procedures: moderate  Management options: moderate    Patient Progress  Patient progress: stable      Disposition  Final diagnoses:   Heroin abuse ([facility] [address] )   Lightheadedness     Time reflects when diagnosis was documented in both MDM as applicable and the Disposition within this note     Time User Action Codes Description Comment    12/25/2021  1:50 AM Albaro Bermudez Add [F11 10] [facility]     12/25/2021  1:51 AM Albaro Bermudez Add [R42] [address]       ED Disposition     ED Disposition Condition Date/Time Comment    Discharge Stable Sat Dec 25, 2021  1:50 AM Armando Win discharge to home/self care  Follow-up Information     Follow up With Specialties Details Why Contact Info    Tara Torre MD Family Medicine Call  As needed [address] [facility] [address] [phone]            There are no discharge medications for this patient  No discharge procedures on file      PDMP Review       Value Time User    PDMP Reviewed  Yes 12/24/2021  3:36 PM Romy Mares MD          ED Provider  Electronically Signed by           Tee Dowell PA-C  12/25/21 5722

## 2021-12-27 NOTE — UTILIZATION REVIEW
Notification of Discharge   This is a Notification of Discharge from our facility [address]  Please be advised that this patient has been discharge from our facility  Below you will find the admission and discharge date and time including the patients disposition  UTILIZATION REVIEW CONTACT:  Edwige Pate  Utilization   Network Utilization Review Department  Phone: [phone] x carefully listen to the prompts  All voicemails are confidential   Email: [email or web address]     PHYSICIAN ADVISORY SERVICES:  FOR TAXI-FR-WKDX REVIEW - MEDICAL NECESSITY DENIAL  Phone: [phone]  Fax: [phone]  Email: [email or web address]     PRESENTATION DATE: 12/24/2021  1:28 AM  OBERVATION ADMISSION DATE:   INPATIENT ADMISSION DATE: 12/24/21  2:59 AM   DISCHARGE DATE: 12/24/2021  3:49 PM  DISPOSITION: Home/Self Care Home/Self Care      IMPORTANT INFORMATION:  Send all requests for admission clinical reviews, approved or denied determinations and any other requests to dedicated fax number below belonging to the campus where the patient is receiving treatment   List of dedicated fax numbers:  [address] [place] [address] DENIALS (Administrative/Medical Necessity) [phone]   [address] (Maternity/NICU/Pediatrics) [phone]   [facility] [phone]   [address] [phone]   [address] [place] [address] [phone]   [facility] [address] [facility] [phone]   [facility]  [phone]   [address] [facility] [address]  [address] [facility] [address] [facility] [phone]

## 2022-02-22 ENCOUNTER — HOSPITAL ENCOUNTER (EMERGENCY)
Facility: HOSPITAL | Age: 32
Discharge: HOME | End: 2022-02-22
Attending: EMERGENCY MEDICINE
Payer: MEDICARE

## 2022-02-22 VITALS
BODY MASS INDEX: 20.99 KG/M2 | TEMPERATURE: 99.5 F | SYSTOLIC BLOOD PRESSURE: 127 MMHG | WEIGHT: 134.04 LBS | RESPIRATION RATE: 20 BRPM | OXYGEN SATURATION: 96 % | DIASTOLIC BLOOD PRESSURE: 77 MMHG | HEART RATE: 85 BPM

## 2022-02-22 DIAGNOSIS — R21 DIFFUSE PAPULAR RASH: Primary | ICD-10-CM

## 2022-02-22 DIAGNOSIS — L29.9 PRURITUS: ICD-10-CM

## 2022-02-22 PROCEDURE — 99283 EMERGENCY DEPT VISIT LOW MDM: CPT

## 2022-02-22 PROCEDURE — 99284 EMERGENCY DEPT VISIT MOD MDM: CPT | Performed by: EMERGENCY MEDICINE

## 2022-02-22 RX ORDER — HYDROXYZINE HYDROCHLORIDE 25 MG/1
25 TABLET, FILM COATED ORAL EVERY 6 HOURS
Qty: 12 TABLET | Refills: 0 | Status: SHIPPED | OUTPATIENT
Start: 2022-02-22 | End: 2022-04-22 | Stop reason: HOSPADM

## 2022-02-22 RX ORDER — HYDROXYZINE HYDROCHLORIDE 25 MG/1
25 TABLET, FILM COATED ORAL ONCE
Status: COMPLETED | OUTPATIENT
Start: 2022-02-22 | End: 2022-02-22

## 2022-02-22 RX ORDER — SULFAMETHOXAZOLE AND TRIMETHOPRIM 800; 160 MG/1; MG/1
1 TABLET ORAL ONCE
Status: COMPLETED | OUTPATIENT
Start: 2022-02-22 | End: 2022-02-22

## 2022-02-22 RX ORDER — SULFAMETHOXAZOLE AND TRIMETHOPRIM 800; 160 MG/1; MG/1
1 TABLET ORAL 2 TIMES DAILY
Qty: 14 TABLET | Refills: 0 | Status: SHIPPED | OUTPATIENT
Start: 2022-02-22 | End: 2022-03-01

## 2022-02-22 RX ORDER — CEPHALEXIN 250 MG/1
500 CAPSULE ORAL ONCE
Status: COMPLETED | OUTPATIENT
Start: 2022-02-22 | End: 2022-02-22

## 2022-02-22 RX ORDER — CHLORHEXIDINE GLUCONATE 4 G/100ML
1 SOLUTION TOPICAL DAILY PRN
Qty: 120 ML | Refills: 0 | Status: SHIPPED | OUTPATIENT
Start: 2022-02-22 | End: 2022-04-22 | Stop reason: HOSPADM

## 2022-02-22 RX ORDER — CEPHALEXIN 500 MG/1
500 CAPSULE ORAL EVERY 6 HOURS SCHEDULED
Qty: 28 CAPSULE | Refills: 0 | Status: SHIPPED | OUTPATIENT
Start: 2022-02-22 | End: 2022-03-01

## 2022-02-22 RX ADMIN — CEPHALEXIN 500 MG: 250 CAPSULE ORAL at 04:20

## 2022-02-22 RX ADMIN — SULFAMETHOXAZOLE AND TRIMETHOPRIM 1 TABLET: 800; 160 TABLET ORAL at 04:20

## 2022-02-22 RX ADMIN — HYDROXYZINE HYDROCHLORIDE 25 MG: 25 TABLET ORAL at 04:20

## 2022-02-22 NOTE — ED NOTES
Pt seen, assessed and discharged independent of nursing staff      Maribel Montiel RN  02/22/22 4922

## 2022-02-22 NOTE — ED PROVIDER NOTES
History  Chief Complaint   Patient presents with    Skin Problem     pt arrives with multiple small scabs all over face/body  pt reports "i was using a small pin to pop the areas cause there was puss in them" pt states "i mean it could be the cocaine i injected it could have meth in it" pt took 50mg of benadryl at home    Manic Behavior     pts behavior is having manic behavior in triage      Vivek Moran is a 32y o  year old male with PMH of IVDU, polysubstance abuse presenting to the [facility] ED for evaluation of a rash  Patient reports 2 days of worsening generalized rash  The rash is noted on the face, both arms and bilateral legs  The patient states he has been very anxious as his friend overdosed and he has a in the who is ill  The patient states he has been scratching on his face, arms and legs which he believes is causing the rash  The patient did not notice the lesions prior to onset of patient  The patient is concerned he may have MRSA  The patient states he has a history of MRSA infection previously  Patient has history of IV drug use, last use reportedly 1 week ago  Patient denies fevers, night sweats, weight loss  No back pain, incontinence or numbness  Patient has taken Benadryl at home for symptomatic treatment  History provided by:  Patient   used: No        None       Past Medical History:   Diagnosis Date    Drug abuse ([facility] [address] )     Hepatitis C        Past Surgical History:   Procedure Laterality Date    TONSILLECTOMY         History reviewed  No pertinent family history  I have reviewed and agree with the history as documented      E-Cigarette/Vaping     E-Cigarette/Vaping Substances    Nicotine No     THC No     CBD No     Flavoring No     Other No     Unknown No      Social History     Tobacco Use    Smoking status: Current Every Day Smoker     Packs/day: 1 00    Smokeless tobacco: Never Used   Vaping Use    Vaping Use: Not on file   Substance Use Topics    Alcohol use: Yes     Comment: socially    Drug use: Yes     Types: Benzodiazepines, Heroin        Review of Systems   Constitutional: Negative for chills, fatigue, fever and unexpected weight change  HENT: Negative for congestion and rhinorrhea  Eyes: Negative for visual disturbance  Respiratory: Negative for cough and shortness of breath  Cardiovascular: Negative for chest pain  Gastrointestinal: Negative for abdominal pain, diarrhea, nausea and vomiting  Endocrine: Negative for polyuria  Genitourinary: Negative for dysuria and flank pain  Musculoskeletal: Negative for arthralgias, back pain, gait problem, joint swelling and neck pain  Skin: Positive for rash and wound  Neurological: Negative for weakness, light-headedness, numbness and headaches  Hematological: Does not bruise/bleed easily  Psychiatric/Behavioral: Negative for behavioral problems and confusion  The patient is nervous/anxious  All other systems reviewed and are negative  Physical Exam  ED Triage Vitals [02/22/22 0321]   Temperature Pulse Respirations Blood Pressure SpO2   99 5 °F (37 5 °C) 85 20 127/77 96 %      Temp Source Heart Rate Source Patient Position - Orthostatic VS BP Location FiO2 (%)   Oral Monitor Sitting Right arm --      Pain Score       --             Orthostatic Vital Signs  Vitals:    02/22/22 0321   BP: 127/77   Pulse: 85   Patient Position - Orthostatic VS: Sitting       Physical Exam  Vitals and nursing note reviewed  Constitutional:       General: He is not in acute distress  Appearance: Normal appearance  He is well-developed  He is not ill-appearing, toxic-appearing or diaphoretic  HENT:      Head: Normocephalic and atraumatic  Nose: No congestion or rhinorrhea  Eyes:      General:         Right eye: No discharge  Left eye: No discharge  Cardiovascular:      Rate and Rhythm: Normal rate and regular rhythm        Heart sounds: No murmur heard       Pulmonary:      Effort: Pulmonary effort is normal  No respiratory distress  Breath sounds: Normal breath sounds  No wheezing or rales  Abdominal:      General: There is no distension  Palpations: Abdomen is soft  Tenderness: There is no abdominal tenderness  There is no right CVA tenderness, left CVA tenderness, guarding or rebound  Musculoskeletal:      Cervical back: Normal range of motion  No rigidity or bony tenderness  Thoracic back: No bony tenderness  Lumbar back: No bony tenderness  Skin:     General: Skin is warm  Capillary Refill: Capillary refill takes less than 2 seconds  Findings: Rash (Generalized erythematous papules involving the face, bilateral forearms, hands and bilateral legs ) present  Rash is papular  Rash is not vesicular  Comments: No splinter hemorrhage b/l fingernail   Neurological:      Mental Status: He is alert and oriented to person, place, and time  GCS: GCS eye subscore is 4  GCS verbal subscore is 5  GCS motor subscore is 6  Comments: 5/5 strength b/l UE/LE  Sensation grossly intact throughout  Psychiatric:         Attention and Perception: Attention and perception normal  He does not perceive auditory or visual hallucinations  Speech: Speech is rapid and pressured  Behavior: Behavior is cooperative           ED Medications  Medications   sulfamethoxazole-trimethoprim (BACTRIM DS) 800-160 mg per tablet 1 tablet (1 tablet Oral Given 2/22/22 0420)   cephalexin (KEFLEX) capsule 500 mg (500 mg Oral Given 2/22/22 0420)   hydrOXYzine HCL (ATARAX) tablet 25 mg (25 mg Oral Given 2/22/22 0420)       Diagnostic Studies  Results Reviewed     None                 No orders to display         Procedures  Procedures      ED Course                                       MDM  Number of Diagnoses or Management Options  Diffuse papular rash  Pruritus  Diagnosis management comments:   32 y o  male presenting for evaluation of a generalized rash  Afebrile, diffuse papular rash in the setting of pruritus  Will treat with Abx, first dose provided in the ED  I have discussed with the patient our plan to discharge them from the ED and the patient is in agreement with this plan  The patient was provided a written after visit summary with strict RTED precautions  Discharge Plan: Rx for bactrim, keflex, PRN atarax  Followup: I have discussed with the patient plan to follow up with their PCP  Contact information provided in AVS        Amount and/or Complexity of Data Reviewed  Review and summarize past medical records: yes    Patient Progress  Patient progress: stable      Disposition  Final diagnoses:   Diffuse papular rash   Pruritus     Time reflects when diagnosis was documented in both MDM as applicable and the Disposition within this note     Time User Action Codes Description Comment    2/22/2022  4:15 AM Cristiane Serramos Add [R21] Diffuse papular rash     2/22/2022  4:16 AM Adánnie Serramos Add [L29 9] Pruritus       ED Disposition     ED Disposition Condition Date/Time Comment    Discharge Stable Tue Feb 22, 2022  4:17 AM Tanja Lopez discharge to home/self care  Follow-up Information     Follow up With Specialties Details Why Contact Info    Ramiro Hodges MD  Schedule an appointment as soon as possible for a visit  To make appointment for reevaluation in 3-5 days   [address] [address] [facility]            Discharge Medication List as of 2/22/2022  4:17 AM      START taking these medications    Details   cephalexin (KEFLEX) 500 mg capsule Take 1 capsule (500 mg total) by mouth every 6 (six) hours for 7 days, Starting Tue 2/22/2022, Until Tue 3/1/2022, Print      chlorhexidine (HIBICLENS) 4 % external liquid Apply 1 application topically daily as needed for wound care, Starting Tue 2/22/2022, Print      hydrOXYzine HCL (ATARAX) 25 mg tablet Take 1 tablet (25 mg total) by mouth every 6 (six) hours, Starting Tue 2/22/2022, Print      sulfamethoxazole-trimethoprim (BACTRIM DS) 800-160 mg per tablet Take 1 tablet by mouth 2 (two) times a day for 7 days smx-tmp DS (BACTRIM) 800-160 mg tabs (1tab q12 D10), Starting Tue 2/22/2022, Until Tue 3/1/2022, Print           No discharge procedures on file  PDMP Review       Value Time User    PDMP Reviewed  Yes 12/24/2021  3:36 PM Jermain Harrison MD           ED Provider  Attending physically available and evaluated Leonides Smith  ROBERT managed the patient along with the ED Attending      Electronically Signed by         Jose Saldana DO  02/22/22 7917

## 2022-02-22 NOTE — ED ATTENDING ATTESTATION
2/22/2022  IValerie DO, saw and evaluated the patient  I have discussed the patient with the resident/non-physician practitioner and agree with the resident's/non-physician practitioner's findings, Plan of Care, and MDM as documented in the resident's/non-physician practitioner's note, except where noted  All available labs and Radiology studies were reviewed  I was present for key portions of any procedure(s) performed by the resident/non-physician practitioner and I was immediately available to provide assistance  At this point I agree with the current assessment done in the Emergency Department  I have conducted an independent evaluation of this patient a history and physical is as follows:    Patient is a 80-year-old male with a history drug abuse that presents for multiple areas of skin abscesses, scabs and lesions  Patient does admit to injecting cocaine and possibly methamphetamines  Patient did take Benadryl at home without much relief  This is been an ongoing issue for the patient  Came in tonight for worsening discomfort  On exam the patient does have multiple areas on his face, upper and lower extremities that are consistent with superficial abscesses, areas of cellulitis and skin lesions  Some areas are scabbed over  There is no one area that is amenable to an incision and drainage  No active draining noted to any of the sites  No streaking noted  There are some sites adjacent to the left wrist and left knee but nothing overlying the joint space  No signs of septic joint  Patient does have and anxious mood and is slightly pressured  Patient does easily direct and lays back in bed  Patient does have history of MRSA  Given his history drug use and picking, will start on antibiotics, Atarax and chlorhexidine wash  Return ER precautions if he develops any systemic signs or symptoms or an area of a worsening abscess does develop    ED Course         Critical Care Time  Procedures

## 2022-04-20 ENCOUNTER — HOSPITAL ENCOUNTER (INPATIENT)
Facility: HOSPITAL | Age: 32
LOS: 2 days | Discharge: HOME | DRG: 773 | End: 2022-04-22
Attending: EMERGENCY MEDICINE | Admitting: EMERGENCY MEDICINE
Payer: MEDICARE

## 2022-04-20 DIAGNOSIS — E83.42 HYPOMAGNESEMIA: ICD-10-CM

## 2022-04-20 DIAGNOSIS — S61.409A: ICD-10-CM

## 2022-04-20 DIAGNOSIS — R74.01 TRANSAMINITIS: ICD-10-CM

## 2022-04-20 DIAGNOSIS — F19.10 POLYSUBSTANCE ABUSE (HCC): ICD-10-CM

## 2022-04-20 DIAGNOSIS — F11.20 OPIOID USE DISORDER, SEVERE, DEPENDENCE (HCC): ICD-10-CM

## 2022-04-20 DIAGNOSIS — F11.10 HEROIN ABUSE (HCC): Primary | ICD-10-CM

## 2022-04-20 DIAGNOSIS — F10.239 ALCOHOL WITHDRAWAL (HCC): ICD-10-CM

## 2022-04-20 DIAGNOSIS — F10.10 ALCOHOL ABUSE: ICD-10-CM

## 2022-04-20 PROBLEM — F10.939 ALCOHOL WITHDRAWAL SYNDROME WITH COMPLICATION (HCC): Status: ACTIVE | Noted: 2022-04-20

## 2022-04-20 PROBLEM — D64.9 ANEMIA: Status: ACTIVE | Noted: 2022-04-20

## 2022-04-20 PROBLEM — F10.20 ALCOHOL USE DISORDER, SEVERE, DEPENDENCE (HCC): Status: ACTIVE | Noted: 2022-04-20

## 2022-04-20 LAB
ALBUMIN SERPL BCP-MCNC: 3.3 G/DL (ref 3–5.2)
ALP SERPL-CCNC: 96 U/L (ref 43–122)
ALT SERPL W P-5'-P-CCNC: 146 U/L
AMPHETAMINES SERPL QL SCN: NEGATIVE
ANION GAP SERPL CALCULATED.3IONS-SCNC: 3 MMOL/L (ref 5–14)
APAP SERPL-MCNC: <10 UG/ML (ref 10–20)
AST SERPL W P-5'-P-CCNC: 114 U/L (ref 17–59)
ATRIAL RATE: 60 BPM
BARBITURATES UR QL: NEGATIVE
BASOPHILS # BLD AUTO: 0.03 THOUSANDS/ΜL (ref 0–0.1)
BASOPHILS NFR BLD AUTO: 0 % (ref 0–1)
BENZODIAZ UR QL: NEGATIVE
BILIRUB SERPL-MCNC: 0.28 MG/DL
BILIRUB UR QL STRIP: NEGATIVE
BUN SERPL-MCNC: 11 MG/DL (ref 5–25)
CALCIUM ALBUM COR SERPL-MCNC: 9.6 MG/DL (ref 8.3–10.1)
CALCIUM SERPL-MCNC: 9 MG/DL (ref 8.4–10.2)
CHLORIDE SERPL-SCNC: 104 MMOL/L (ref 97–108)
CLARITY UR: CLEAR
CO2 SERPL-SCNC: 31 MMOL/L (ref 22–30)
COCAINE UR QL: POSITIVE
COLOR UR: ABNORMAL
CREAT SERPL-MCNC: 0.73 MG/DL (ref 0.7–1.5)
EOSINOPHIL # BLD AUTO: 0.23 THOUSAND/ΜL (ref 0–0.61)
EOSINOPHIL NFR BLD AUTO: 3 % (ref 0–6)
ERYTHROCYTE [DISTWIDTH] IN BLOOD BY AUTOMATED COUNT: 13.2 % (ref 11.6–15.1)
ETHANOL SERPL-MCNC: <10 MG/DL (ref 0–10)
FLUAV RNA RESP QL NAA+PROBE: NEGATIVE
FLUBV RNA RESP QL NAA+PROBE: NEGATIVE
GFR SERPL CREATININE-BSD FRML MDRD: 123 ML/MIN/1.73SQ M
GLUCOSE SERPL-MCNC: 136 MG/DL (ref 70–99)
GLUCOSE UR STRIP-MCNC: NEGATIVE MG/DL
HCT VFR BLD AUTO: 35.9 % (ref 36.5–49.3)
HGB BLD-MCNC: 11.4 G/DL (ref 12–17)
HGB UR QL STRIP.AUTO: NEGATIVE
HIV 1+2 AB+HIV1 P24 AG SERPL QL IA: NORMAL
HIV1 P24 AG SER QL: NORMAL
IMM GRANULOCYTES # BLD AUTO: 0.07 THOUSAND/UL (ref 0–0.2)
IMM GRANULOCYTES NFR BLD AUTO: 1 % (ref 0–2)
KETONES UR STRIP-MCNC: NEGATIVE MG/DL
LEUKOCYTE ESTERASE UR QL STRIP: NEGATIVE
LYMPHOCYTES # BLD AUTO: 2.73 THOUSANDS/ΜL (ref 0.6–4.47)
LYMPHOCYTES NFR BLD AUTO: 37 % (ref 14–44)
MAGNESIUM SERPL-MCNC: 1.4 MG/DL (ref 1.6–2.3)
MCH RBC QN AUTO: 29.2 PG (ref 26.8–34.3)
MCHC RBC AUTO-ENTMCNC: 31.8 G/DL (ref 31.4–37.4)
MCV RBC AUTO: 92 FL (ref 82–98)
METHADONE UR QL: NEGATIVE
MONOCYTES # BLD AUTO: 0.76 THOUSAND/ΜL (ref 0.17–1.22)
MONOCYTES NFR BLD AUTO: 10 % (ref 4–12)
NEUTROPHILS # BLD AUTO: 3.51 THOUSANDS/ΜL (ref 1.85–7.62)
NEUTS SEG NFR BLD AUTO: 49 % (ref 43–75)
NITRITE UR QL STRIP: NEGATIVE
NRBC BLD AUTO-RTO: 0 /100 WBCS
OPIATES UR QL SCN: NEGATIVE
OXYCODONE+OXYMORPHONE UR QL SCN: NEGATIVE
P AXIS: 48 DEGREES
PCP UR QL: NEGATIVE
PH UR STRIP.AUTO: 6 [PH]
PHOSPHATE SERPL-MCNC: 3.6 MG/DL (ref 2.5–4.8)
PLATELET # BLD AUTO: 368 THOUSANDS/UL (ref 149–390)
PMV BLD AUTO: 8.4 FL (ref 8.9–12.7)
POTASSIUM SERPL-SCNC: 3.8 MMOL/L (ref 3.6–5)
PR INTERVAL: 112 MS
PROT SERPL-MCNC: 6.3 G/DL (ref 5.9–8.4)
PROT UR STRIP-MCNC: NEGATIVE MG/DL
QRS AXIS: 55 DEGREES
QRSD INTERVAL: 86 MS
QT INTERVAL: 404 MS
QTC INTERVAL: 404 MS
RBC # BLD AUTO: 3.9 MILLION/UL (ref 3.88–5.62)
RSV RNA RESP QL NAA+PROBE: NEGATIVE
SALICYLATES SERPL-MCNC: <1 MG/DL (ref 10–30)
SARS-COV-2 RNA RESP QL NAA+PROBE: NEGATIVE
SODIUM SERPL-SCNC: 138 MMOL/L (ref 137–147)
SP GR UR STRIP.AUTO: 1.02 (ref 1–1.04)
T WAVE AXIS: 30 DEGREES
THC UR QL: POSITIVE
UROBILINOGEN UA: 1 MG/DL
VENTRICULAR RATE: 60 BPM
WBC # BLD AUTO: 7.33 THOUSAND/UL (ref 4.31–10.16)

## 2022-04-20 PROCEDURE — 84100 ASSAY OF PHOSPHORUS: CPT | Performed by: EMERGENCY MEDICINE

## 2022-04-20 PROCEDURE — 80179 DRUG ASSAY SALICYLATE: CPT | Performed by: EMERGENCY MEDICINE

## 2022-04-20 PROCEDURE — 82077 ASSAY SPEC XCP UR&BREATH IA: CPT | Performed by: EMERGENCY MEDICINE

## 2022-04-20 PROCEDURE — 36415 COLL VENOUS BLD VENIPUNCTURE: CPT | Performed by: EMERGENCY MEDICINE

## 2022-04-20 PROCEDURE — 99223 1ST HOSP IP/OBS HIGH 75: CPT | Performed by: PHYSICIAN ASSISTANT

## 2022-04-20 PROCEDURE — 80307 DRUG TEST PRSMV CHEM ANLYZR: CPT | Performed by: EMERGENCY MEDICINE

## 2022-04-20 PROCEDURE — 80143 DRUG ASSAY ACETAMINOPHEN: CPT | Performed by: EMERGENCY MEDICINE

## 2022-04-20 PROCEDURE — 80053 COMPREHEN METABOLIC PANEL: CPT | Performed by: EMERGENCY MEDICINE

## 2022-04-20 PROCEDURE — HZ2ZZZZ DETOXIFICATION SERVICES FOR SUBSTANCE ABUSE TREATMENT: ICD-10-PCS | Performed by: EMERGENCY MEDICINE

## 2022-04-20 PROCEDURE — 87806 HIV AG W/HIV1&2 ANTB W/OPTIC: CPT | Performed by: PHYSICIAN ASSISTANT

## 2022-04-20 PROCEDURE — 93005 ELECTROCARDIOGRAM TRACING: CPT

## 2022-04-20 PROCEDURE — 93010 ELECTROCARDIOGRAM REPORT: CPT | Performed by: INTERNAL MEDICINE

## 2022-04-20 PROCEDURE — 99285 EMERGENCY DEPT VISIT HI MDM: CPT | Performed by: EMERGENCY MEDICINE

## 2022-04-20 PROCEDURE — 85025 COMPLETE CBC W/AUTO DIFF WBC: CPT | Performed by: EMERGENCY MEDICINE

## 2022-04-20 PROCEDURE — 83735 ASSAY OF MAGNESIUM: CPT | Performed by: EMERGENCY MEDICINE

## 2022-04-20 PROCEDURE — NC001 PR NO CHARGE: Performed by: PHYSICIAN ASSISTANT

## 2022-04-20 PROCEDURE — 99284 EMERGENCY DEPT VISIT MOD MDM: CPT

## 2022-04-20 PROCEDURE — 0241U HB NFCT DS VIR RESP RNA 4 TRGT: CPT | Performed by: EMERGENCY MEDICINE

## 2022-04-20 RX ORDER — MAGNESIUM SULFATE HEPTAHYDRATE 40 MG/ML
2 INJECTION, SOLUTION INTRAVENOUS ONCE
Status: COMPLETED | OUTPATIENT
Start: 2022-04-20 | End: 2022-04-20

## 2022-04-20 RX ORDER — CLONIDINE HYDROCHLORIDE 0.1 MG/1
0.1 TABLET ORAL EVERY 6 HOURS PRN
Status: DISCONTINUED | OUTPATIENT
Start: 2022-04-20 | End: 2022-04-22 | Stop reason: HOSPADM

## 2022-04-20 RX ORDER — LANOLIN ALCOHOL/MO/W.PET/CERES
100 CREAM (GRAM) TOPICAL DAILY
Status: DISCONTINUED | OUTPATIENT
Start: 2022-04-20 | End: 2022-04-22 | Stop reason: HOSPADM

## 2022-04-20 RX ORDER — AMOXICILLIN AND CLAVULANATE POTASSIUM 875; 125 MG/1; MG/1
1 TABLET, FILM COATED ORAL ONCE
Status: COMPLETED | OUTPATIENT
Start: 2022-04-20 | End: 2022-04-20

## 2022-04-20 RX ORDER — NICOTINE 21 MG/24HR
1 PATCH, TRANSDERMAL 24 HOURS TRANSDERMAL DAILY
Status: DISCONTINUED | OUTPATIENT
Start: 2022-04-21 | End: 2022-04-22 | Stop reason: HOSPADM

## 2022-04-20 RX ORDER — ACETAMINOPHEN 325 MG/1
650 TABLET ORAL EVERY 6 HOURS PRN
Status: DISCONTINUED | OUTPATIENT
Start: 2022-04-20 | End: 2022-04-22 | Stop reason: HOSPADM

## 2022-04-20 RX ORDER — PHENOBARBITAL SODIUM 65 MG/ML
65 INJECTION INTRAMUSCULAR ONCE
Status: COMPLETED | OUTPATIENT
Start: 2022-04-20 | End: 2022-04-20

## 2022-04-20 RX ORDER — SODIUM CHLORIDE 9 MG/ML
125 INJECTION, SOLUTION INTRAVENOUS CONTINUOUS
Status: DISCONTINUED | OUTPATIENT
Start: 2022-04-20 | End: 2022-04-21

## 2022-04-20 RX ORDER — FOLIC ACID 1 MG/1
1 TABLET ORAL DAILY
Status: DISCONTINUED | OUTPATIENT
Start: 2022-04-20 | End: 2022-04-22 | Stop reason: HOSPADM

## 2022-04-20 RX ORDER — ONDANSETRON 2 MG/ML
4 INJECTION INTRAMUSCULAR; INTRAVENOUS EVERY 6 HOURS PRN
Status: DISCONTINUED | OUTPATIENT
Start: 2022-04-20 | End: 2022-04-22 | Stop reason: HOSPADM

## 2022-04-20 RX ADMIN — THIAMINE HCL TAB 100 MG 100 MG: 100 TAB at 17:05

## 2022-04-20 RX ADMIN — MULTIPLE VITAMINS W/ MINERALS TAB 1 TABLET: TAB ORAL at 17:05

## 2022-04-20 RX ADMIN — FOLIC ACID 1 MG: 1 TABLET ORAL at 17:05

## 2022-04-20 RX ADMIN — MAGNESIUM SULFATE IN WATER 2 G: 40 INJECTION, SOLUTION INTRAVENOUS at 19:26

## 2022-04-20 RX ADMIN — PHENOBARBITAL SODIUM 65 MG: 65 INJECTION INTRAMUSCULAR; INTRAVENOUS at 19:25

## 2022-04-20 RX ADMIN — SODIUM CHLORIDE 125 ML/HR: 0.9 INJECTION, SOLUTION INTRAVENOUS at 19:25

## 2022-04-20 RX ADMIN — AMOXICILLIN AND CLAVULANATE POTASSIUM 1 TABLET: 875; 125 TABLET, FILM COATED ORAL at 17:05

## 2022-04-20 RX ADMIN — MAGNESIUM OXIDE TAB 400 MG (241.3 MG ELEMENTAL MG) 800 MG: 400 (241.3 MG) TAB at 17:45

## 2022-04-20 NOTE — ASSESSMENT & PLAN NOTE
· Reports h/o frequent klonopin use   · Reports last use 2 mg  4/17/2022  · Follow SEWS protocol with symptom-triggered phenobarbital for medically-assisted benzodiazepine withdrawal

## 2022-04-20 NOTE — ED PROVIDER NOTES
History  Chief Complaint   Patient presents with    Detox Evaluation     detox from alcohol and heroin  Last drank yesterday 1/5 vodka, uses 2 bundles heroin a day IV, last used last night     Patient is a 27-year-old male with a history of hepatitis-C and drug abuse coming in today asking for help for rehab for detox  Patient states he drinks approximately a 5th of vodka daily for several months  He has had 2 seizures in the past from alcohol withdrawal   He states he cannot remember the last time he was not intoxicated from this  He states in order to help with the detox off alcohol he will by benzos off the street and use approximately 1-2 mg minimal to help break through  He also reports using 2 bundles of heroin IV daily for several years  He states what set him off was that he saw someone OD required recurrent yesterday and he is asking for help today  He has no other complaints  He does state that he has wounds on his bilateral hands which she was seen in [place] for however he lost the prescription for antibiotic  He has no fevers or chills      History provided by:  Patient   used: No    Detox Evaluation  Similar prior episodes: yes    Severity:  Unable to specify  Onset quality:  Gradual  Duration: years    Timing:  Constant  Progression:  Unchanged  Suspected agents:  Alcohol and heroin (benzo)  Associated symptoms: no abdominal pain, no agitation, no blackouts, no bladder incontinence, no bowel incontinence, no confusion, no hallucinations, no headaches, no loss of consciousness, no nausea, no palpitations, no seizures, no shortness of breath, no somnolence, no suicidal ideation, no violence, no vomiting and no weakness    Risk factors: addiction treatment and withdrawal syndrome    Risk factors: no chronic illness, no mental illness, no psychiatric hx, no recent illness and no recent infection        Prior to Admission Medications   Prescriptions Last Dose Informant Patient Reported? Taking?   chlorhexidine (HIBICLENS) 4 % external liquid   No No   Sig: Apply 1 application topically daily as needed for wound care   hydrOXYzine HCL (ATARAX) 25 mg tablet   No No   Sig: Take 1 tablet (25 mg total) by mouth every 6 (six) hours      Facility-Administered Medications: None       Past Medical History:   Diagnosis Date    Drug abuse ([facility] [address] )     Hepatitis C        Past Surgical History:   Procedure Laterality Date    TONSILLECTOMY         History reviewed  No pertinent family history  I have reviewed and agree with the history as documented  E-Cigarette/Vaping     E-Cigarette/Vaping Substances    Nicotine No     THC No     CBD No     Flavoring No     Other No     Unknown No      Social History     Tobacco Use    Smoking status: Current Every Day Smoker     Packs/day: 1 00    Smokeless tobacco: Never Used   Vaping Use    Vaping Use: Not on file   Substance Use Topics    Alcohol use: Yes     Comment: socially    Drug use: Yes     Types: Benzodiazepines, Heroin       Review of Systems   Constitutional: Negative  Negative for chills and fever  HENT: Negative  Negative for ear pain and sore throat  Eyes: Negative  Negative for pain and visual disturbance  Respiratory: Negative  Negative for cough and shortness of breath  Cardiovascular: Negative  Negative for chest pain and palpitations  Gastrointestinal: Negative  Negative for abdominal pain, bowel incontinence, nausea and vomiting  Endocrine: Negative  Genitourinary: Negative  Negative for bladder incontinence, dysuria and hematuria  Musculoskeletal: Negative  Negative for arthralgias and back pain  Skin: Negative  Negative for color change and rash  Neurological: Negative  Negative for seizures, loss of consciousness, syncope, weakness and headaches  Hematological: Negative  Psychiatric/Behavioral: Negative  Negative for agitation, confusion, hallucinations and suicidal ideas     All other systems reviewed and are negative  Physical Exam  Physical Exam  Vitals and nursing note reviewed  Constitutional:       Appearance: He is well-developed and underweight  Comments: Patient appears older than stated age  Disheveled appearing   HENT:      Head: Normocephalic and atraumatic  Comments: Patient maintaining airway and secretions  No stridor   No brawniness under tongue  Mouth/Throat:      Mouth: Mucous membranes are dry  Eyes:      Extraocular Movements: Extraocular movements intact  Conjunctiva/sclera: Conjunctivae normal       Pupils: Pupils are equal, round, and reactive to light  Cardiovascular:      Rate and Rhythm: Normal rate and regular rhythm  Heart sounds: No murmur heard  Pulmonary:      Effort: Pulmonary effort is normal  No respiratory distress  Breath sounds: Normal breath sounds  Abdominal:      Palpations: Abdomen is soft  Tenderness: There is no abdominal tenderness  Musculoskeletal:      Cervical back: Neck supple  Skin:     General: Skin is warm and dry  Capillary Refill: Capillary refill takes less than 2 seconds  Comments: Diffuse source throughout bilateral hands and forearms  There is surrounding erythema without any warmth  No evidence of cellulitis  No petechiae purpura  Concern for localized infection at the sites due to injection  Will give antibiotics as well   Neurological:      General: No focal deficit present  Mental Status: He is alert and oriented to person, place, and time  Psychiatric:         Mood and Affect: Mood normal          Behavior: Behavior normal          Thought Content:  Thought content normal          Judgment: Judgment normal          Vital Signs  ED Triage Vitals [04/20/22 1623]   Temperature Pulse Respirations Blood Pressure SpO2   97 8 °F (36 6 °C) 77 18 113/69 98 %      Temp Source Heart Rate Source Patient Position - Orthostatic VS BP Location FiO2 (%)   Oral Monitor Sitting Left arm --      Pain Score       No Pain           Vitals:    04/20/22 1623 04/20/22 1715 04/20/22 1745   BP: 113/69     Pulse: 77 61 66   Patient Position - Orthostatic VS: Sitting  Lying         Visual Acuity      ED Medications  Medications   thiamine tablet 100 mg (100 mg Oral Given 8/28/66 1737)   folic acid (FOLVITE) tablet 1 mg (1 mg Oral Given 4/20/22 1705)   multivitamin-minerals (CENTRUM) tablet 1 tablet (1 tablet Oral Given 4/20/22 1705)   magnesium oxide (MAG-OX) tablet 800 mg (800 mg Oral Given 4/20/22 1745)   amoxicillin-clavulanate (AUGMENTIN) 875-125 mg per tablet 1 tablet (1 tablet Oral Given 4/20/22 1705)       Diagnostic Studies  Results Reviewed     Procedure Component Value Units Date/Time    COVID/FLU/RSV - 2 hour TAT [[id number]]  (Normal) Collected: 04/20/22 1653    Lab Status: Final result Specimen: Nares from Nose Updated: 04/20/22 1741     SARS-CoV-2 Negative     INFLUENZA A PCR Negative     INFLUENZA B PCR Negative     RSV PCR Negative    Narrative:      FOR PEDIATRIC PATIENTS - copy/paste COVID Guidelines URL to browser: [email or web address]  [employer]    SARS-CoV-2 assay is a Nucleic Acid Amplification assay intended for the  qualitative detection of nucleic acid from SARS-CoV-2 in nasopharyngeal  swabs  Results are for the presumptive identification of SARS-CoV-2 RNA  Positive results are indicative of infection with SARS-CoV-2, the virus  causing COVID-19, but do not rule out bacterial infection or co-infection  with other viruses  Laboratories within the [place] and its  territories are required to report all positive results to the appropriate  public health authorities  Negative results do not preclude SARS-CoV-2  infection and should not be used as the sole basis for treatment or other  patient management decisions   Negative results must be combined with  clinical observations, patient history, and epidemiological information  This test has not been FDA cleared or approved  This test has been authorized by FDA under an Emergency Use Authorization  (EUA)  This test is only authorized for the duration of time the  declaration that circumstances exist justifying the authorization of the  emergency use of an in vitro diagnostic tests for detection of SARS-CoV-2  virus and/or diagnosis of COVID-19 infection under section 564(b)(1) of  the Act, 21 U  S C  580AHV-4(H)(4), unless the authorization is terminated  or revoked sooner  The test has been validated but independent review by FDA  and CLIA is pending  Test performed using [employer]: This RT-PCR assay targets N2,  a region unique to SARS-CoV-2  A conserved region in the E-gene was chosen  for pan-Sarbecovirus detection which includes SARS-CoV-2  Magnesium [[id number]]  (Abnormal) Collected: 04/20/22 1654    Lab Status: Final result Specimen: Blood from Arm, Left Updated: 04/20/22 1730     Magnesium 1 4 mg/dL     Acetaminophen level-If concentration is detectable, please discuss with medical  on call  [[id number]]  (Abnormal) Collected: 04/20/22 1654    Lab Status: Final result Specimen: Blood from Arm, Left Updated: 04/20/22 1730     Acetaminophen Level <08 ug/mL     Salicylate level [[id number]]  (Abnormal) Collected: 04/20/22 1654    Lab Status: Final result Specimen: Blood from Arm, Left Updated: 70/08/13 8725     Salicylate Lvl <5 1 mg/dL     Rapid drug screen, urine [[id number]]  (Abnormal) Collected: 04/20/22 1653    Lab Status: Final result Specimen: Urine, Clean Catch Updated: 04/20/22 1730     Amph/Meth UR Negative     Barbiturate Ur Negative     Benzodiazepine Urine Negative     Cocaine Urine Positive     Methadone Urine Negative     Opiate Urine Negative     PCP Ur Negative     THC Urine Positive     Oxycodone Urine Negative    Narrative:      Presumptive report  If requested, specimen will be sent to reference lab for confirmation    FOR MEDICAL PURPOSES ONLY  IF CONFIRMATION NEEDED PLEASE CONTACT THE LAB WITHIN 5 DAYS      Drug Screen Cutoff Levels:  AMPHETAMINE/METHAMPHETAMINES  1000 ng/mL  BARBITURATES     200 ng/mL  BENZODIAZEPINES     200 ng/mL  COCAINE      300 ng/mL  METHADONE      300 ng/mL  OPIATES      300 ng/mL  PHENCYCLIDINE     25 ng/mL  THC       50 ng/mL  OXYCODONE      100 ng/mL    Phosphorus [[id number]]  (Normal) Collected: 04/20/22 1654    Lab Status: Final result Specimen: Blood from Arm, Left Updated: 04/20/22 1730     Phosphorus 3 6 mg/dL     Ethanol [[id number]]  (Normal) Collected: 04/20/22 1653    Lab Status: Final result Specimen: Blood from Arm, Right Updated: 04/20/22 1730     Ethanol Lvl <10 mg/dL     Comprehensive metabolic panel [[id number]]  (Abnormal) Collected: 04/20/22 1654    Lab Status: Final result Specimen: Blood from Arm, Left Updated: 04/20/22 1730     Sodium 138 mmol/L      Potassium 3 8 mmol/L      Chloride 104 mmol/L      CO2 31 mmol/L      ANION GAP 3 mmol/L      BUN 11 mg/dL      Creatinine 0 73 mg/dL      Glucose 136 mg/dL      Calcium 9 0 mg/dL      Corrected Calcium 9 6 mg/dL       U/L       U/L      Alkaline Phosphatase 96 U/L      Total Protein 6 3 g/dL      Albumin 3 3 g/dL      Total Bilirubin 0 28 mg/dL      eGFR 123 ml/min/1 73sq m     Narrative:      Laura guidelines for Chronic Kidney Disease (CKD):     Stage 1 with normal or high GFR (GFR > 90 mL/min/1 73 square meters)    Stage 2 Mild CKD (GFR = 60-89 mL/min/1 73 square meters)    Stage 3A Moderate CKD (GFR = 45-59 mL/min/1 73 square meters)    Stage 3B Moderate CKD (GFR = 30-44 mL/min/1 73 square meters)    Stage 4 Severe CKD (GFR = 15-29 mL/min/1 73 square meters)    Stage 5 End Stage CKD (GFR <15 mL/min/1 73 square meters)  Note: GFR calculation is accurate only with a steady state creatinine    UA (URINE) with reflex to Scope [[id number]]  (Abnormal) Collected: 04/20/22 1653    Lab Status: Final result Specimen: Urine, Clean Catch Updated: 04/20/22 1713     Color, UA Susan     Clarity, UA Clear     Specific Gravity, UA 1 025     pH, UA 6 0     Leukocytes, UA Negative     Nitrite, UA Negative     Protein, UA Negative mg/dl      Glucose, UA Negative mg/dl      Ketones, UA Negative mg/dl      Bilirubin, UA Negative     Blood, UA Negative     UROBILINOGEN UA 1 0 mg/dL     CBC and differential [[id number]]  (Abnormal) Collected: 04/20/22 1653    Lab Status: Final result Specimen: Blood from Arm, Right Updated: 04/20/22 1703     WBC 7 33 Thousand/uL      RBC 3 90 Million/uL      Hemoglobin 11 4 g/dL      Hematocrit 35 9 %      MCV 92 fL      MCH 29 2 pg      MCHC 31 8 g/dL      RDW 13 2 %      MPV 8 4 fL      Platelets 029 Thousands/uL      nRBC 0 /100 WBCs      Neutrophils Relative 49 %      Immat GRANS % 1 %      Lymphocytes Relative 37 %      Monocytes Relative 10 %      Eosinophils Relative 3 %      Basophils Relative 0 %      Neutrophils Absolute 3 51 Thousands/µL      Immature Grans Absolute 0 07 Thousand/uL      Lymphocytes Absolute 2 73 Thousands/µL      Monocytes Absolute 0 76 Thousand/µL      Eosinophils Absolute 0 23 Thousand/µL      Basophils Absolute 0 03 Thousands/µL                  No orders to display              Procedures  Procedures         ED Course  ED Course as of 04/20/22 1817 Wed Apr 20, 2022   1642 Patient is a 20-year-old male coming in today asking for detox  He states he abuses heroin, alcohol benzos  On exam he appears older than stated age and disheveled  He is, cooperative  No SI or HI  Will start medical workup as well as discuss with detox for possible bed placement  Will order CIWA as well      Portions of the record may have been created with voice recognition software  Occasional wrong word or "sound a like" substitutions may have occurred due to the inherent limitations of voice recognition software   Read the chart carefully and recognize, using context, where substitutions have occurred  [address] [facility] Patient's labs are stable  Magnesium 0 and will replace orally  Mild transaminitis mildly increased compared old and does have a history of hepatitis  Positive for cocaine and marijuana otherwise hemodynamically stable   1752 Correction patient he himself overdosing was given Narcan  Patient accepted to detox unit                               SBIRT 22yo+      Most Recent Value   SBIRT (24 yo +)    In order to provide better care to our patients, we are screening all of our patients for alcohol and drug use  Would it be okay to ask you these screening questions? No Filed at: 04/20/2022 1627                    MDM  Number of Diagnoses or Management Options  Diagnosis management comments:     EKG INTERPRETATION @ 1709  RHYTHM:  Normal sinus rhythm at 60 beats per minute  AXIS:  Normal axis  INTERVALS:  IA interval measured at 112 milliseconds  QRS COMPLEX:  QRS measured at 86 milliseconds  ST SEGMENT:  Nonspecific ST segment changes  Diffuse artifact  QT INTERVAL:  QTC measured 404milliseconds  COMPARED WITH PRIOR   Melissa Ventura   Interpretation by Tima Canseco, DO         Amount and/or Complexity of Data Reviewed  Clinical lab tests: ordered and reviewed  Tests in the medicine section of CPT®: ordered and reviewed        Disposition  Final diagnoses:   Heroin abuse ([facility] [address] )   Alcohol abuse   Alcohol withdrawal ([facility] [address] )   Polysubstance abuse ([facility] [address] )   Hypomagnesemia   Transaminitis   Open wound of hand     Time reflects when diagnosis was documented in both MDM as applicable and the Disposition within this note     Time User Action Codes Description Comment    4/20/2022  5:20 PM Marceil Grise Add [F11 10] Heroin abuse ([facility] [address] )     4/20/2022  5:20 PM Marceil Grise Add [F10 10] Alcohol abuse     4/20/2022  5:20 PM Marceil Grise Add [F10 239] Alcohol withdrawal ([facility] [address] )     4/20/2022  5:20 PM BendKatrin kim Add [F19 10] Polysubstance abuse ([facility] [address] )     4/20/2022  5:34 PM Marceil Grise Add [E83 42] Hypomagnesemia     4/20/2022  5:34 PM Renato Market Add [R74 01] Transaminitis     4/20/2022  6:17 PM Chelle Yusuf Add [S61 409A] Open wound of hand       ED Disposition     ED Disposition Condition Date/Time Comment    Admit Stable Wed Apr 20, 2022  5:53 PM Case was discussed with Gilbert Rey and the patient's admission status was agreed to be Admission Status: inpatient status to the service of Dr Sharri Cheney   Follow-up Information    None         Patient's Medications   Discharge Prescriptions    No medications on file       No discharge procedures on file      PDMP Review       Value Time User    PDMP Reviewed  Yes 12/24/2021  3:36 PM Thong Mirza MD          ED Provider  Electronically Signed by           Tootie Patton DO  04/20/22 [address],   04/20/22 1606

## 2022-04-20 NOTE — H&P
[address] [facility]  H&P- Ralph Altamirano 1990, 32 y o  male MRN: [id number]  Unit/Bed#: 5T DETOX 758-89 Encounter: [id number]  Primary Care Provider: Waqar Cerda MD   Date and time admitted to hospital: 4/20/2022  4:25 PM      HISTORY & PHYSICAL EXAM  DEPARTMENT OF MEDICAL TOXICOLOGY  LEVEL 4 MEDICAL DETOX UNIT  Ralph Altamirano 32 y o  male MRN: [id number]  Unit/Bed#: 5T DETOX 515-01 Encounter: [id number]      Reason for Admission/Principal Problem: Ethanol withdrawal, Ethanol use disorder  Admitting Provider: Priti Mcdaniels PA-C  Attending Provider: Andi Freed DO   4/20/2022  4:25 PM      * Alcohol withdrawal syndrome with complication [facility])  Assessment & Plan  H/o chronic daily alcohol consumption, reports h/o withdrawal seizures  Last drink afternoon 4/19/2022  Ethanol <10 (4/20/2022 1654)  Follow SEWS protocol with symptom-triggered phenobarbital for medically-assisted alcohol withdrawal  Current symptoms include nausea, anxiety  Administer 65 mg phenobarbital   Continue to monitor vitals, symptoms    Benzodiazepine withdrawal without complication ([facility] )  Assessment & Plan  · Reports h/o frequent klonopin use   · Reports last use 2 mg  4/17/2022  · Follow SEWS protocol with symptom-triggered phenobarbital for medically-assisted benzodiazepine withdrawal    Alcohol use disorder, severe, dependence ([facility] )  Assessment & Plan  Patient with h/o chronic daily alcohol use  Currently consumes fifth vodka daily  · Reports last use afternoon of 4/19/2022  Pertinent labs:  CBC 4/20/2022: hgb 11 4, MCV 92, platelets 793  CMP 2/19/2316: sodium 138, K+ 3 8  anion gap 3  LFTs- , , alk phos 96    Ethanol <10 (4/20/2022 1654)  Initiate IVFs, daily thiamine/folic acid supplementation  Follow SEWS protocol for medically-assisted alcohol withdrawal as above  Consult case management for assistance with rehab resources- currently interested in inpatient resources on discharge    Moderate benzodiazepine use disorder (HCC)  Assessment & Plan  · Long-term h/o benzo use since teens  · Reports he uses klonopin 2 mg several times weekly   · Acquires from the streets (not prescribed)  · Last use 4/17/2022  · Notes he primarily takes to "prevent alcohol withdrawal seizures"  · Follow SEWS as above     Opioid withdrawal (HCC)  Assessment & Plan  · Daily IV heroin use- 2 bundles daily  · Reports last use 4/19/2022 (afternoon)  · Follow COWS/MAT protocol for medically-assisted opioid withdrawal  · Will transition to COWS once stable from alcohol/benzo withdrawal  · Continue supportive care    IVDU (intravenous drug user)  Assessment & Plan  · Reports current IV drug use of heroin/fentanyl daily and occasional cocaine  · States he will reuse needles, denies sharing needles with others  · Reports he injects only in b/l AC fossae  · Track marks visible b/l AC fossa, no evidence of abscess/acute infection currently   · Also presents with skin-picking lesions to face and b/l hands/forearms (possibly from recent meth use)  · No evidence of acute infection: no diffuse erythema, no drainage, no abscess   · H/o of + hep C, untreated  · UDS 4/20/2022: + cocaine, THC  · Recommend outpatient follow-up with GI for further management of hep C  · Check HIV, acute hepatitis panel  · Encourage cessation    Chronic hepatitis C without hepatic coma ([facility] )  Assessment & Plan  · Patient with h/o untreated hep c in setting of IVDU  · Hepatitis C quantitative PCR 1/26/2022: 993,000  · CMP 4/20/2022:  LFTs , , alk-phos 96   T bili 0 28  · Check update hep C PCR  · Encourage cessation of IVDU  · Recommend outpatient GI follow-up for ongoing monitoring/maintenance    Opioid use disorder, severe, dependence ([facility] )  Assessment & Plan  · Patient reports h/o opioid abuse since his teens   · Currently injecting 2 bundles heroin daily   · Reports last use afternoon 4/19/2022  · Notes past treatment with methadone approx   6 yrs ago  · Previously prescribed suboxone 8 mg BID via [facility]  · [address] reviewed- most recent script 12/22/2021: suboxone 8-2 mg (28 tabs, 14 days)  · Currently interested in resuming suboxone  · Follow COWS/MAT protocol for medically-assisted opioid withdrawal as above  · Consult case management for assistance with rehab resources- currently interested in inpatient rehab upon discharge     Anemia  Assessment & Plan  · CBC 4/20/2022 revealed hgb 11 4, MCV 92  · Per chart review, baseline hgb appears to be 12-13  · No evidence of acute bleeding   · Initiate thiamine and folic acid supplementation  · Continue to monitor, repeat CBC in AM    Polysubstance abuse ([facility] )  Assessment & Plan  · Reports daily IV heroin use along with IV cocaine use (at least once weekly) and occasionally smokes marijuana  · Presents with multiple skin-picking lesions of face and b/l hands/forearms  · States he believe cocaine he used recently was possibly laced with meth  · UDS + cocaine, THC  · Encourage cessation  · Consult case management- interested in inpatient rehab on discharge    Hypomagnesemia  Assessment & Plan  · Magnesium 1 4, K+ 3 8  · Received 800 mg mag oxide in ED PTA  · Will administer additional supplementation: 2 g magnesium sulfate   · Continue to monitor electrolytes and supplement as indicated    Tobacco dependence due to cigarettes  Assessment & Plan  Current every day smoker: 1 ppd  Encourage cessation  Offer nicotine replacement    Anxiety and depression  Assessment & Plan  · H/o anxiety/depression   · Currently does not take any outpatient medications, does not follow with outpatient psychiatry  · Per chart review, previously prescribed lexapro and atarax  · Currently denies SI, HI  · Recommend outpatient follow-up PCP/psychiatry        VTE Prophylaxis: Enoxaparin (Lovenox)  / sequential compression device   Code Status: level 1 full code      Anticipated Length of Stay:  Patient will be admitted on an Inpatient basis with an anticipated length of stay of  >2  midnights  Justification for Hospital Stay: ongoing management of medically-assisted alcohol/benzo/opioid withdrawal     For any questions or concerns, please Tiger Text the advanced practitioner in the role of [facility]-DETOX-AP On Call      This patient qualifies for Level IV medically managed intensive inpatient services under the criteria set by the American Society of Addiction Medicine, including dimensions 1-3  The patient is in withdrawal (or is intoxicated with high risk of withdrawal), with severe and unstable medical and/or psychiatric (dual diagnosis) problems, requiring requires 24-hour medical and nursing care and the full resources of a [address] patient to medical detox unit and continue supportive care and stabilization of acute ethanol withdrawal per medical toxicology/detox treatment pathway  Monitor ethanol withdrawal severity via the Severity of Ethanol Withdrawal Scale (SEWS) Q4 hours and then hourly if/when SEWS > 6  Treat withdrawal per pathway and reassess Q30-60 minutes  Mild SEWS Score 1-6  Administer medications* (IV or PO; PO preferred):   If initial SEWS score: diazepam 10mg PO/IV x 1 AND phenobarbital 65 mg PO/IV x 1   If repeat SEWS score 1-6: phenobarbital 65 mg PO/IV q1 hour x 5 doses maximum   Reassessment:    SEWS q1 hour after each dose until SEWS 0 x 2 hours   VS q1 hours (until SEWS 0, then q4 hours)   Notify provider for bedside evaluation if 5-dose maximum is reached, RASS of -3 to -5, or SEWS score escalates to moderate or severe     Moderate SEWS Score 7-12  Administer medications* (IV):   If initial SEWS score: diazepam 10mg IV x 1 AND phenobarbital 260 mg IV x 1   If repeat SEWS score 7-12 or score escalated from mild: phenobarbital 130 mg IV q30 minutes x 5 doses maximum   Reassessment:   SEWS q30 minutes after each dose until SEWS < 7 (then hourly until SEWS 0 x 2 hours)   VS q30 minutes until SEWS < 7 (then hourly until SEWS 0, then q4 hours)   Notify provider for bedside evaluation if 5-dose maximum is reached, RASS of -3 to -5, or SEWS score escalates to severe  Severe SEWS Score ? 13  Administer medications* (IV):   If initial SEWS score: Diazepam 10 mg IV x 1 AND phenobarbital 650 mg IV piggyback x 1 over 15-30 minutes   If repeat SEWS score ? 13 or score escalated from mild or moderate: phenobarbital 130 mg IV q30 minutes x 5 doses maximum   Reassessment:   SEWS q30 minutes after each dose until SEWS < 7 (then hourly until SEWS 0 x 2 hours)    VS q30 minutes until SEWS < 7 (then hourly until SEWS 0, then q4 hours)   Notify provider for bedside evaluation if 5-dose maximum is reached or RASS of -3 to -5   *Hold medications and notify provider if CNS depression, respirations < 10/min, or RASS of -3 to -5  Medications to be administered adjunctively if more than 2 grams of phenobarbital is needed for stabilization of withdrawal; require attending approval     Dexmedetomidine infusion 0 1-1mcg/kg/hr IV infusion, titratable to reduced agitation (Goal: RASS -2)   Ketamine   o Acute agitated delirium: 1-2 mg/kg IV or 4-5 mg/kg IM  o Refractory withdrawal: 0 1-1mg/kg/hr IV infusion, titratable to reduced agitation (Goal: RASS -2)    Further evaluation, screening and treatment:  Evaluate complete metabolic panel, transaminases, INR, and lipase  Assess hepatic ultrasound for any sign of alcoholic liver disease or cirrhosis, and ultimately refer for further hepatic evaluation and care as/if indicated  Additional medications for ethanol associated malnutrition:   Thiamine 100 mg IV daily, increase to 500 mg TID for signs/symptoms of Wernicke's Encephalopathy or Wernicke Korsakoff Syndrome   Folic acid 1 mg IV daily   Multivitamin PO daily      Will offer first monthly injection of Naltrexone 380 mg IM, once patient is stabilized, as it has been shown to assist in decreasing cravings for ethanol  Evaluate and treat for coexisting substance use, such as opioids and nicotine  Discuss risk factors for infectious disease, such as history of intravenous drug abuse, and offer hepatitis and HIV screening if indicated  Case management consultation to assist with coordination of subsequent treatment after discharge  MEDICATION ASSISTED TREATMENT PATHWAY     Admit patient to medical detox unit and continue supportive care and stabilization of acute opioid withdrawal per medical toxicology/detox medication assisted treatment pathway  Monitor opioid severity via Clinical Opioid Withdrawal Scale (COWS) Q4 hours and administer buprenorphine/naloxone 8mg/2mg when COWS >8, or when greater than 24 hours have elapsed from most recent opioid use (excluding long-acting opioids, such as methadone)  Continue to monitor opioid severity Q30-60 minutes after first dose and administer additional buprenorphine 2-4mg every 30-60 minutes until COWS < 8 for two consecutive hours  (Max dose 32 mg)                Adjunctive medications administered as needed:  Clonidine 0 1 mg PO Q6 hours PRN anxiety or palpitations    Gabapentin 300mg PO Q8 hours PRN anxiety    Ibuprofen 600 mg PO Q6 hours PRN pain    Acetaminophen 1000mg PO Q8 hours PRN pain    Ondansetron 4 mg PO Q6 hours PRN N/V    Nicotine patch 7, 14, 21 mg  PRN nicotine withdrawal   Trazodone 50 mg PO QHS PRN sleep    Loperamide 4 mg PO PRN diarrhea up to 16 mg/day        The risks, benefits and mechanism of buprenorphine/naloxone were discussed and patient agreed to treatment  Case management consultation will take place to assist with coordination of subsequent treatment after discharge       Administer daily multivitamin  Evaluate and treat for coexisting substance use, such as nicotine   Discuss risk factors for infectious disease, such as history of intravenous drug abuse, and offer hepatitis and HIV screening if indicated  Hx and PE limited by: occasionally falls asleep during encounter     HPI: Clinton Bridges is a 32y o  year old male PMH untreated chronic hep C, IVDU, AUD with h/o withdrawal seizure, benzodiazepine use, OUD, polysubstance use, everyday smoker, anxiety/depression who presents with alcohol, benzo, and opioid withdrawal  Patient originally presented to [facility] ED 4/20/2022 seeking detox from alcohol and heroin  On admission, patient notes the he witnessed a friend overdose yesterday and required narcan; states it was a scary experience for him and is the reason he wants to stop using  Also notes that he himself was administered narcan by a friend a few days ago; notes he has required hospitalizations for overdose in the past  Reports that witnessing his friend overdose was scarier than experiencing it himself  Reports he has been injecting 2 bundles heroin daily, last use yesterday afternoon  Previously prescribed suboxone but not currently taking, interested in restarting  Per chart review, patient previously reported he started using percocets at age 12, started snorting heroin at age 24, and injecting IV heroin at age 22  Also reports consuming fifth vodka daily, last drink yesterday afternoon  Reports most recently sober about a year ago for 2 months  Reports h/o withdrawal seizures from alcohol  States he takes klonopin to avoid alcohol withdrawal seizures; takes 2 mg several times a week, acquires from the streets, most recent use 3 days ago  Of note, patient previously admitted to [facility] medical detox unit, however left AMA within 14 hrs of admission  [facility]  Reports he was not ready to stop using at that time, but is motivated to stop this time  Reports he wants detox "from everything"; also reports he is interested in inpatient rehab on discharge    Denies use of any substances today PTA; reports he is just "tired" from not sleeping for several days  Preferred alcoholic beverage(s): vodka  Quantity and frequency of alcohol intake: fifth daily   Use of any ethanol substitutes (toxic alcohols): no  Date/Time of last alcohol intake: afternoon 4/19/2022  Current signs and symptoms of ethanol withdrawal: anxiety and nausea    SEWS Total Score: 6 (4/20/2022  7:09 PM)    Ethanol Withdrawal History  Previous ethanol withdrawal? yes  Prior inpatient treatment for ethanol withdrawal? yes  Prior outpatient treatment for ethanol withdrawal? yes  History of seizures with prior ethanol withdrawal? yes  Prior treatment with naltrexone (Vivitrol)? yes  Current treatment with naltrexone (Vivitrol)? no  Other current treatment for ethanol use disorder? no  Co-existing substance use? Yes- heroin, cocaine, marijuana; denies intentionally using meth, however states cocaine he used recently might have been laced with meth    Opioid history  Opioids currently used: heroin and fentanyl  Route of use: intravenous  Date/Time of Last Opioid Use: sometime afternoon of 4/19/2022- 2 bundles   Current Signs/Symptoms of Opioid Withdrawal: anxiety     Methadone & Buprenorphine History  History of prior treatment for opioid dependence? yes  Currently on Methadone Maintenance? No; however was prescribed methadone in past (around 2010)  History of prior treatment with Suboxone? yes  Currently taking Suboxone? no  History of using Suboxone without having a prescription? yes  History of IVDA?  yes    Review of PDMP: yes   04/01/2022  2   04/01/2022  Lorazepam 0 5 MG Tablet    6 00  6 Annabelle Jerrod   [phone]   Pha ([id number])     [place]   04/01/2022  2   04/01/2022  Lorazepam 1 MG Tablet    8 00  6 Annabelle Jerrod   [id number]   Pha ([id number])     [place]   12/22/2021  3   12/22/2021  Buprenorphine-Nalox 8-2mg Film    28 00  14 Tomma Quarto   [id number]   Pen (1925)    16 00 mg  [place]         Social History     Substance and Sexual Activity   Alcohol Use Yes    Comment: 1/5 michael     Social History     Substance and Sexual Activity   Drug Use Yes    Types: Benzodiazepines, Heroin, Marijuana, Cocaine     Social History     Tobacco Use   Smoking Status Current Every Day Smoker    Packs/day: 1 00   Smokeless Tobacco Never Used       Review of Systems   Constitutional: Positive for appetite change (anorexia)  Negative for chills and fever  HENT: Negative for congestion, ear pain, sneezing and sore throat  Eyes: Negative for pain and visual disturbance  Respiratory: Negative for cough and shortness of breath  Cardiovascular: Negative for chest pain and palpitations  Gastrointestinal: Positive for abdominal pain (generalized abdominal cramping) and nausea  Negative for constipation, diarrhea and vomiting  Genitourinary: Negative for difficulty urinating, dysuria and hematuria  Musculoskeletal: Negative for arthralgias and back pain  Skin: Negative for color change and rash  Neurological: Positive for headaches  Negative for dizziness, tremors, seizures, syncope, weakness, light-headedness and numbness  Psychiatric/Behavioral: Positive for sleep disturbance (reports he has not slept in several days; states this is reason for dozing off during encounter)  The patient is nervous/anxious  All other systems reviewed and are negative  Historical Information   Past Medical History:   Diagnosis Date    Drug abuse ([facility] )     Hepatitis C      Past Surgical History:   Procedure Laterality Date    TONSILLECTOMY       History reviewed  No pertinent family history    Social History   Marital Status: Legally    Occupation: unemployed  Patient Pre-hospital Living Situation: "couch surfing", staying with friends  Patient Pre-hospital Level of Mobility: independent   Patient Pre-hospital Diet Restrictions: none    Allergies   Allergen Reactions    Onion - Food Allergy Itching    Fish Allergy - Food Allergy Swelling       Prior to Admission medications    Medication Sig Start Date End Date Taking? Authorizing Provider   chlorhexidine (HIBICLENS) 4 % external liquid Apply 1 application topically daily as needed for wound care 2/22/22   David Guerrero DO   hydrOXYzine HCL (ATARAX) 25 mg tablet Take 1 tablet (25 mg total) by mouth every 6 (six) hours 2/22/22   David Guerrero DO       Current Facility-Administered Medications   Medication Dose Route Frequency    acetaminophen (TYLENOL) tablet 650 mg  650 mg Oral Q6H PRN    cloNIDine (CATAPRES) tablet 0 1 mg  0 1 mg Oral Q6H PRN    [START ON 4/21/2022] enoxaparin (LOVENOX) subcutaneous injection 40 mg  40 mg Subcutaneous Daily    folic acid (FOLVITE) tablet 1 mg  1 mg Oral Daily    [START ON 2/39/7281] folic acid 1 mg, thiamine (VITAMIN B1) 100 mg in sodium chloride 0 9 % 100 mL IV piggyback   Intravenous Daily    multivitamin-minerals (CENTRUM) tablet 1 tablet  1 tablet Oral Daily    [START ON 4/21/2022] multivitamin-minerals (CENTRUM) tablet 1 tablet  1 tablet Oral Daily    [START ON 4/21/2022] nicotine (NICODERM CQ) 21 mg/24 hr TD 24 hr patch 1 patch  1 patch Transdermal Daily    ondansetron (ZOFRAN) injection 4 mg  4 mg Intravenous Q6H PRN    sodium chloride 0 9 % infusion  125 mL/hr Intravenous Continuous    thiamine tablet 100 mg  100 mg Oral Daily       Continuous Infusions:sodium chloride, 125 mL/hr, Last Rate: 125 mL/hr (04/20/22 1925)             Objective     No intake or output data in the 24 hours ending 04/20/22 2235    Invasive Devices:   Peripheral IV 04/20/22 Right Arm (Active)   Site Assessment WDL 04/20/22 1654   Dressing Type Transparent 04/20/22 1654   Line Status Blood return noted; Flushed 04/20/22 1654   Dressing Status Clean;Dry; Intact 04/20/22 1654       Vitals    04/20/22 1715 04/20/22 1745 04/20/22 1857   BP:   124/68   TempSrc:   Tympanic   Pulse: 61 66 70   Resp: 12 12 14   Patient Position - Orthostatic VS:  Lying Lying   Temp:   98 5 °F (36 9 °C) Physical Exam  Vitals reviewed  Constitutional:       General: He is not in acute distress  Appearance: Normal appearance  He is normal weight  He is not ill-appearing or diaphoretic  Comments: Occasionally falls asleep during encounter    HENT:      Head: Normocephalic and atraumatic  Nose: Nose normal  No congestion  Mouth/Throat:      Mouth: Mucous membranes are moist       Pharynx: Oropharynx is clear  Eyes:      General: No scleral icterus  Extraocular Movements: Extraocular movements intact  Conjunctiva/sclera: Conjunctivae normal       Pupils: Pupils are equal, round, and reactive to light  Cardiovascular:      Rate and Rhythm: Normal rate and regular rhythm  Pulses: Normal pulses  Heart sounds: Normal heart sounds  No murmur heard  No friction rub  No gallop  Pulmonary:      Effort: Pulmonary effort is normal  No respiratory distress  Breath sounds: Normal breath sounds  No wheezing, rhonchi or rales  Abdominal:      General: Abdomen is flat  Bowel sounds are normal  There is no distension  Palpations: Abdomen is soft  Tenderness: There is no abdominal tenderness  There is no guarding  Musculoskeletal:         General: No swelling  Normal range of motion  Cervical back: Normal range of motion  Right lower leg: No edema  Left lower leg: No edema  Skin:     General: Skin is warm and dry  Capillary Refill: Capillary refill takes less than 2 seconds  Findings: Abrasion (skin-picking lesions of forearms and face) present  No abscess or erythema  Comments: Track marks evident b/l ACs  Skin-picking lesions noted dorsal surface of hands b/l, b/l forearms, and face  No piloerection  Neurological:      General: No focal deficit present  Mental Status: He is alert and oriented to person, place, and time  Mental status is at baseline  Sensory: No sensory deficit  Motor: No tremor  Coordination: Finger-Nose-Finger Test normal    Psychiatric:         Attention and Perception: Attention normal          Mood and Affect: Mood is anxious  Speech: Speech normal          Behavior: Behavior is cooperative  Data:    EKG, Pathology, and Other Studies: I have personally reviewed pertinent reports  · EKG (4/20/2022): Normal sinus rhythm  Normal ECG  QTc 404 ms   When compared with ECG of 24-DEC-2021 02:06, No significant change was found" (Confirmed by Ray Castellanos ([id number]) on 4/20/2022 5:11:56 PM)    Lab Results:  CBC ETOH     Lab Results   Component Value Date    WBC 7 33 04/20/2022    RBC 3 90 04/20/2022    HGB 11 4 (L) 04/20/2022    HCT 35 9 (L) 04/20/2022    MCV 92 04/20/2022    MCH 29 2 04/20/2022    MCHC 31 8 04/20/2022    RDW 13 2 04/20/2022     04/20/2022    MPV 8 4 (L) 04/20/2022      No results found for: LACTICACID   CMP UA         Component Value Date/Time    K 3 8 04/20/2022 1654     04/20/2022 1654    CO2 31 (H) 04/20/2022 1654    BUN 11 04/20/2022 1654    CREATININE 0 73 04/20/2022 1654         Component Value Date/Time    CALCIUM 9 0 04/20/2022 1654    ALKPHOS 96 04/20/2022 1654     (H) 04/20/2022 1654     (H) 04/20/2022 1654      Lab Results   Component Value Date    CLARITYU Clear 04/20/2022    COLORU Susan (A) 04/20/2022    SPECGRAV 1 025 04/20/2022    PHUR 6 0 04/20/2022    GLUCOSEU Negative 04/20/2022    KETONESU Negative 04/20/2022    BLOODU Negative 04/20/2022    PROTEIN UA Negative 04/20/2022    NITRITE Negative 04/20/2022    BILIRUBINUR Negative 04/20/2022    UROBILINOGEN 1 0 04/20/2022    LEUKOCYTESUR Negative 04/20/2022        Liver Function Test: ASA     Lab Results   Component Value Date    TBILI 0 28 04/20/2022    ALKPHOS 96 04/20/2022     (H) 04/20/2022     (H) 04/20/2022    TP 6 3 04/20/2022    ALB 3 3 04/20/2022      Lab Results   Component Value Date    SALICYLATE <6 2 (L) 28/30/4714      Troponin APAP     No results found for: TROPONINI   Lab Results   Component Value Date    ACTMNPHEN <10 (L) 04/20/2022      VBG HCG     No results found for: PHVEN, EIR0ATV, PO2VEN, AEG3XZR, BEVEN, H2RQIMEHP, Z8NFIIH   No results found for: HCGQUANT   ABG Urine Drug Screen     No results found for: PHART, AOQ1HTA, PO2ART, MEN5RMC, BEART, E1GKVWSNG, O2HGB, SOURC, RUTH, VTAC, ACRATE, INSPIREDAIR, PEEP   Lab Results   Component Value Date    AMPMETHUR Negative 04/20/2022    BARBTUR Negative 04/20/2022    BDZUR Negative 04/20/2022    COCAINEUR Positive (A) 04/20/2022    METHADONEUR Negative 04/20/2022    OPIATEUR Negative 04/20/2022    PCPUR Negative 04/20/2022    THCUR Positive (A) 04/20/2022    OXYCODONEUR Negative 04/20/2022      Lactate INR     No results found for: LACTICACID   No results found for: INR   PTT Protime     No results found for: PTT     No results found for: PROTIME       Imaging Studies: I have personally reviewed pertinent reports  Counseling / Coordination of Care  Total floor / unit time spent today 67 minutes  Greater than 50% of total time was spent with the patient and / or family counseling and / or coordination of care  ** Please Note: This note has been constructed using a voice recognition system   **

## 2022-04-21 LAB
ALBUMIN SERPL BCP-MCNC: 3 G/DL (ref 3–5.2)
ALP SERPL-CCNC: 86 U/L (ref 43–122)
ALT SERPL W P-5'-P-CCNC: 129 U/L
ANION GAP SERPL CALCULATED.3IONS-SCNC: 2 MMOL/L (ref 5–14)
AST SERPL W P-5'-P-CCNC: 83 U/L (ref 17–59)
BILIRUB SERPL-MCNC: 0.22 MG/DL
BUN SERPL-MCNC: 14 MG/DL (ref 5–25)
CALCIUM ALBUM COR SERPL-MCNC: 9.4 MG/DL (ref 8.3–10.1)
CALCIUM SERPL-MCNC: 8.6 MG/DL (ref 8.4–10.2)
CHLORIDE SERPL-SCNC: 105 MMOL/L (ref 97–108)
CO2 SERPL-SCNC: 33 MMOL/L (ref 22–30)
CREAT SERPL-MCNC: 0.77 MG/DL (ref 0.7–1.5)
ERYTHROCYTE [DISTWIDTH] IN BLOOD BY AUTOMATED COUNT: 13.4 % (ref 11.6–15.1)
GFR SERPL CREATININE-BSD FRML MDRD: 120 ML/MIN/1.73SQ M
GLUCOSE SERPL-MCNC: 83 MG/DL (ref 70–99)
HCT VFR BLD AUTO: 38.8 % (ref 36.5–49.3)
HGB BLD-MCNC: 12 G/DL (ref 12–17)
MAGNESIUM SERPL-MCNC: 1.9 MG/DL (ref 1.6–2.3)
MCH RBC QN AUTO: 28.9 PG (ref 26.8–34.3)
MCHC RBC AUTO-ENTMCNC: 30.9 G/DL (ref 31.4–37.4)
MCV RBC AUTO: 94 FL (ref 82–98)
PLATELET # BLD AUTO: 390 THOUSANDS/UL (ref 149–390)
PMV BLD AUTO: 8.9 FL (ref 8.9–12.7)
POTASSIUM SERPL-SCNC: 4 MMOL/L (ref 3.6–5)
PROT SERPL-MCNC: 6.1 G/DL (ref 5.9–8.4)
RBC # BLD AUTO: 4.15 MILLION/UL (ref 3.88–5.62)
SODIUM SERPL-SCNC: 140 MMOL/L (ref 137–147)
WBC # BLD AUTO: 6.6 THOUSAND/UL (ref 4.31–10.16)

## 2022-04-21 PROCEDURE — 85027 COMPLETE CBC AUTOMATED: CPT | Performed by: PHYSICIAN ASSISTANT

## 2022-04-21 PROCEDURE — 80053 COMPREHEN METABOLIC PANEL: CPT | Performed by: PHYSICIAN ASSISTANT

## 2022-04-21 PROCEDURE — 99232 SBSQ HOSP IP/OBS MODERATE 35: CPT | Performed by: EMERGENCY MEDICINE

## 2022-04-21 PROCEDURE — 87522 HEPATITIS C REVRS TRNSCRPJ: CPT | Performed by: PHYSICIAN ASSISTANT

## 2022-04-21 PROCEDURE — 80074 ACUTE HEPATITIS PANEL: CPT | Performed by: PHYSICIAN ASSISTANT

## 2022-04-21 PROCEDURE — 83735 ASSAY OF MAGNESIUM: CPT | Performed by: PHYSICIAN ASSISTANT

## 2022-04-21 RX ORDER — PHENOBARBITAL SODIUM 130 MG/ML
130 INJECTION INTRAMUSCULAR ONCE
Status: COMPLETED | OUTPATIENT
Start: 2022-04-21 | End: 2022-04-21

## 2022-04-21 RX ORDER — LOPERAMIDE HYDROCHLORIDE 2 MG/1
2 CAPSULE ORAL EVERY 4 HOURS PRN
Status: DISCONTINUED | OUTPATIENT
Start: 2022-04-21 | End: 2022-04-22 | Stop reason: HOSPADM

## 2022-04-21 RX ORDER — PHENOBARBITAL 64.8 MG/1
129.6 TABLET ORAL ONCE
Status: COMPLETED | OUTPATIENT
Start: 2022-04-21 | End: 2022-04-21

## 2022-04-21 RX ADMIN — PHENOBARBITAL SODIUM 130 MG: 130 INJECTION INTRAMUSCULAR; INTRAVENOUS at 11:07

## 2022-04-21 RX ADMIN — PHENOBARBITAL SODIUM 130 MG: 130 INJECTION INTRAMUSCULAR; INTRAVENOUS at 04:54

## 2022-04-21 RX ADMIN — PHENOBARBITAL SODIUM 130 MG: 130 INJECTION INTRAMUSCULAR; INTRAVENOUS at 07:56

## 2022-04-21 RX ADMIN — SODIUM CHLORIDE 125 ML/HR: 0.9 INJECTION, SOLUTION INTRAVENOUS at 03:25

## 2022-04-21 RX ADMIN — PHENOBARBITAL 129.6 MG: 64.8 TABLET ORAL at 14:48

## 2022-04-21 RX ADMIN — LOPERAMIDE HYDROCHLORIDE 2 MG: 2 CAPSULE ORAL at 11:13

## 2022-04-21 RX ADMIN — ONDANSETRON 4 MG: 2 INJECTION INTRAMUSCULAR; INTRAVENOUS at 04:54

## 2022-04-21 NOTE — ASSESSMENT & PLAN NOTE
Patient with h/o chronic daily alcohol use  Currently consumes fifth vodka daily  · Reports last use afternoon of 4/19/2022  Pertinent labs:  CBC 4/20/2022: hgb 11 4, MCV 92, platelets 968  CMP 9/53/0845: sodium 138, K+ 3 8  anion gap 3  LFTs- , , alk phos 96    Ethanol <10 (4/20/2022 1654)  Initiate IVFs, daily thiamine/folic acid supplementation  Follow [facility] protocol for medically-assisted alcohol withdrawal as above  Consult case management for assistance with rehab resources- currently interested in inpatient resources on discharge

## 2022-04-21 NOTE — ASSESSMENT & PLAN NOTE
· Received total of 845 mg of phenobarbital, last dose yesterday  · Withdrawal appears to be well controlled at this time  · Anticipate discharge to inpatient rehab tomorrow if remains well

## 2022-04-21 NOTE — ASSESSMENT & PLAN NOTE
· Had significant opioid withdrawal this morning   · Suboxone induction completed with 12 mg  · Will start maintenance dosing at 8 mg BID tonight  · Anticipate discharge to inpatient rehab tomorrow if remains well

## 2022-04-21 NOTE — ASSESSMENT & PLAN NOTE
· Reports current IV drug use of heroin/fentanyl daily and occasional cocaine  · States he will reuse needles, denies sharing needles with others  · Reports he injects only in b/l AC fossae  · Track marks visible b/l AC fossa, no evidence of abscess/acute infection currently   · Also presents with skin-picking lesions to face and b/l hands/forearms (possibly from recent meth use)  · No evidence of acute infection: no diffuse erythema, no drainage, no abscess   · H/o of + hep C, untreated  · UDS 4/20/2022: + cocaine, THC  · Recommend outpatient follow-up with GI for further management of hep C  · Check HIV, acute hepatitis panel  · Encourage cessation

## 2022-04-21 NOTE — PROGRESS NOTES
22 1049   Referral Data   Referral Source Patient   Referral Reason Drug/Alcohol [address] 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       Worker spoke with pt to complete psycho social assessment  Worker explained role of case management  Worker confirmed address and phone, pt is currently homeless and has no current phone at this time  Pt is currently single, with no children  Pt is currently homeless, was previously residing at [address] [place] [address], [address] [place] [address]  Pt reports he has been homeless for the last few weeks  Pt reports he is currently unemployed and has no income  Pt's highest level of education is HS diploma  Pt denies any legal issues  Pt denies any inpatient psychiatric hospitalizations or any outpatient services  Pt was prescribed Lexapro by his PCP a few months ago  Pt reports he does not recall who his PCP is at this time, reported he has not seen PCP in several months  Pt reports a family hx of mental health including his mother and sister  Pt denies any suicide attempts  Pt denies any abuse  Pt's grandfather  a year ago  UDS: cocaine and THC  Alcohol level-none  AUDIT-no score    Pt reports using heroin for the last 2 years  Pt reports he has been using 2 bundles of heroin daily for the last few months    Pt reports he has overdosed twice, both times requiring narcan  Pt reports drinking 1/5 of vodka daily for the last year  Pt reports he began drinking 2 years ago and increased a year ago to daily drinking  Pt reports buying Klonopin off the street for the last 2 years, using it several times throughout a week  Pt reports using about 1-2mg  Pt reports using cocaine "once in a while"  Pt did not provide specifics regarding cocaine use  Pt reports being in inpatient rehab about 8 months ago, did not recall the name of the rehab  Pt at this time is requesting inpatient rehab  Pt signed JULISSA for [facility]  Pt declined to sign JULISSA for any family or friends at this time  Pt signed JULISSA for [facility]  Pt was recently on detox in December 2021  During that time pt reported he started using heroin in his 19's  Clinical impression:   Pt appears vague and guarded regarding his substance abuse  Pt's report of his current use is different during his last admission in December 2021  Pt reports he wants to attend inpatient rehab, but does appear to lack motivation regarding such  Pt is in the contemplation stage of change  Relapse prevention plan was completed  Pt unable to identify any support persons

## 2022-04-21 NOTE — UTILIZATION REVIEW
Initial Clinical Review    Admission: Date/Time/Statement:   Admission Orders (From admission, onward)     Ordered        04/20/22 1752  INPATIENT ADMISSION  Once                      Orders Placed This Encounter   Procedures    INPATIENT ADMISSION     Standing Status:   Standing     Number of Occurrences:   1     Order Specific Question:   Level of Care     Answer:   Med Surg [16]     Order Specific Question:   Estimated length of stay     Answer:   More than 2 Midnights     Order Specific Question:   Certification     Answer:   I certify that inpatient services are medically necessary for this patient for a duration of greater than two midnights  See H&P and MD Progress Notes for additional information about the patient's course of treatment  ED Arrival Information     Expected Arrival Acuity    - 4/20/2022 16:16 Urgent         Means of arrival Escorted by Service Admission type    Walk-In Self Medical Toxicology Urgent         Arrival complaint    detox eval/ hand sores        Chief Complaint   Patient presents with    Detox Evaluation     detox from alcohol and heroin  Last drank yesterday 1/5 vodka, uses 2 bundles heroin a day IV, last used last night     Initial Presentation: 32 y o  male  PMH untreated chronic hep C, IVDU, AUD with h/o withdrawal seizure, benzodiazepine use, OUD, polysubstance use, everyday smoker, anxiety/depression who presents with alcohol, benzo, and opioid withdrawal  Patient originally presented to [facility] ED 4/20/2022 seeking detox from alcohol and heroin  Reports he has been injecting 2 bundles heroin daily, last use yesterday afternoon  Previously prescribed suboxone but not currently taking, interested in restarting  Reports h/o withdrawal seizures from alcohol  States he takes klonopin to avoid alcohol withdrawal seizures; takes 2 mg several times a week, acquires from the streets, most recent use 3 days ago   Of note, patient previously admitted to [facility] medical detox unit, however left AMA within 14 hrs of admission  Patient states he did not go to [facility] after leaving  Reports he was not ready to stop using at that time, but is motivated to stop this time  Reports he is interested in inpatient rehab on discharge  Date/Time of last alcohol intake: afternoon 4/19/2022  Date/Time of Last Opioid Use: sometime afternoon of 4/19/2022- 2 bundles  Date/Time of last benzodiazepine use: 4/17/22  Plan: Inpatient admission for evaluation and treatment of alcohol withdrawal syndrome with complication and benzodiazepine withdrawal without complication: Follow SEWS protocol with symptom-triggered phenobarbital for medically-assisted alcohol withdrawal, Administer 65 mg phenobarbital, initiate IVs, daily thiamine/folic acid supplementation, consult CM  Date: 4/21   Day 2:     Medical Toxicology: Patient continuing to experience significant withdrawal symptoms this morning including anxiety, nausea, tremulousness, body aches  Having some difficulty tolerating p o  due to the nausea  Giving phenobarbital 390 mg IV dose now due to worsening symptoms  Plan to initiate Suboxone treatment tomorrow when 3 days since last opioid use       ED Triage Vitals [04/20/22 1623]   Temperature Pulse Respirations Blood Pressure SpO2   97 8 °F (36 6 °C) 77 18 113/69 98 %      Temp Source Heart Rate Source Patient Position - Orthostatic VS BP Location FiO2 (%)   Oral Monitor Sitting Left arm --      Pain Score       No Pain          Wt Readings from Last 1 Encounters:   04/20/22 65 3 kg (144 lb)     Additional Vital Signs:   Date/Time Temp Pulse Resp BP MAP (mmHg) SpO2 O2 Device   04/21/22 1100 98 1 °F (36 7 °C) 66 18 130/67 -- 98 % None (Room air)   04/21/22 0830 98 6 °F (37 °C) 70 18 140/69 -- 98 % None (Room air)   04/21/22 0430 97 6 °F (36 4 °C) 77 16 127/90 102 98 % None (Room air)   04/20/22 2300 98 °F (36 7 °C) 61 16 105/58 73 96 % None (Room air)   04/20/22 2130 -- 54 Abnormal  16 112/61 -- 97 % None (Room air)   04/20/22 2030 -- 59 14 104/55 71 97 % None (Room air)   04/20/22 1857 98 5 °F (36 9 °C) 70 14 124/68 -- 96 % None (Room air)   04/20/22 1856 -- -- -- -- -- 96 % None (Room air)   04/20/22 1745 -- 66 12 -- -- 99 % None (Room air)   04/20/22 1715 -- 61 12 -- -- 97 % None (Room air)   04/20/22 1623 97 8 °F (36 6 °C) 77 18 113/69 -- 98 % None (Room air)     SEWS    Row Name 04/21/22 0830 04/21/22 0751 04/21/22 0630 04/21/22 0530 04/21/22 0438   Severity of Ethanol Withdrawal Scale (SEWS)   ANXIETY: Do you feel that something bad is about to happen to you right now? 0  -MN 3  -MN 0  -RG 0  -RG 3  -RG   NAUSEA and DRY HEAVES or VOMITING? 0  -MN 3  -MN 0  -RG 0  -RG 3  -RG   SWEATING: (includes moist palms, sweating now)? Score 0 or 2 0  -MN 2  -MN 0  -RG 0  -RG 2  -RG   TREMOR: with arms extended eyes closed? 0  -MN 0  -MN 0  -RG 0  -RG 0  -RG   AGITATION: Fidgety, restless, pacing? 0  -MN 0  -MN 0  -RG 0  -RG 0  -RG   DISORIENTATION: 0  -MN 0  -MN 0  -RG 0  -RG 0  -RG   HALLUCINATIONS: 0  -MN 0  -MN 0  -RG 0  -RG 1  -RG   VITAL SIGNS: ANY (Pulse >914, Diastolic BP >83, Temp >97 4) 0  -MN 0  -MN 0  -RG 0  -RG 0  -RG   SEWS Total Score 0  -MN 8  -MN 0  -RG 0  -RG 9  -RG   Nguyen Agitation Sedation Scale (RASS)   Nguyen Agitation Sedation Scale (RASS) -2  -MN 0  -MN -1  -RG -1  -RG 0  -RG   Row Name 04/21/22 0130 04/20/22 2130 04/20/22 2030 04/20/22 1909         Severity of Ethanol Withdrawal Scale (SEWS)        ANXIETY: Do you feel that something bad is about to happen to you right now? 0  -RG 0  -RG 0  -RG 3  -RG         NAUSEA and DRY HEAVES or VOMITING? 0  -RG 0  -RG 0  -RG 3  -RG         SWEATING: (includes moist palms, sweating now)? Score 0 or 2 0  -RG 0  -RG 0  -RG 0  -RG         TREMOR: with arms extended eyes closed? 0  -RG 0  -RG 0  -RG 0  -RG         AGITATION: Fidgety, restless, pacing?  0  -RG 0  -RG 0  -RG 0  -RG         DISORIENTATION: 0  -RG 0  -RG 0  -RG 0  -RG         HALLUCINATIONS: 0  -RG 0  -RG 0  -RG 0  -RG         VITAL SIGNS: ANY (Pulse >926, Diastolic BP >52, Temp >03 4) 0  -RG 0  -RG 0  -RG 0  -RG         SEWS Total Score 0  -RG 0  -RG 0  -RG 6  -RG                      Nguyen Agitation Sedation Scale (RASS)        Nguyen Agitation Sedation Scale (RASS) -1  -RG -1  -RG -1  -RG 0  -RG               Pertinent Labs/Diagnostic Test Results:   No orders to display     Results from last 7 days   Lab Units 04/20/22  1653   SARS-COV-2  Negative     Results from last 7 days   Lab Units 04/21/22  0456 04/20/22  1653   WBC Thousand/uL 6 60 7 33   HEMOGLOBIN g/dL 12 0 11 4*   HEMATOCRIT % 38 8 35 9*   PLATELETS Thousands/uL 390 368   NEUTROS ABS Thousands/µL  --  3 51         Results from last 7 days   Lab Units 04/21/22  0457 04/20/22  1654   SODIUM mmol/L 140 138   POTASSIUM mmol/L 4 0 3 8   CHLORIDE mmol/L 105 104   CO2 mmol/L 33* 31*   ANION GAP mmol/L 2* 3*   BUN mg/dL 14 11   CREATININE mg/dL 0 77 0 73   EGFR ml/min/1 73sq m 120 123   CALCIUM mg/dL 8 6 9 0   MAGNESIUM mg/dL 1 9 1 4*   PHOSPHORUS mg/dL  --  3 6     Results from last 7 days   Lab Units 04/21/22  0457 04/20/22  1654   AST U/L 83* 114*   ALT U/L 129* 146*   ALK PHOS U/L 86 96   TOTAL PROTEIN g/dL 6 1 6 3   ALBUMIN g/dL 3 0 3 3   TOTAL BILIRUBIN mg/dL 0 22 0 28         Results from last 7 days   Lab Units 04/21/22  0457 04/20/22  1654   GLUCOSE RANDOM mg/dL 83 136*         Results from last 7 days   Lab Units 04/20/22  1653   CLARITY UA  Clear   COLOR UA  Susan*   SPEC GRAV UA  1 025   PH UA  6 0   GLUCOSE UA mg/dl Negative   KETONES UA mg/dl Negative   BLOOD UA  Negative   PROTEIN UA mg/dl Negative   NITRITE UA  Negative   BILIRUBIN UA  Negative   UROBILINOGEN UA mg/dL 1 0   LEUKOCYTES UA  Negative     Results from last 7 days   Lab Units 04/20/22  1653   INFLUENZA A PCR  Negative   INFLUENZA B PCR  Negative   RSV PCR  Negative         Results from last 7 days   Lab Units 04/20/22  1653   AMPH/METH  Negative   BARBITURATE UR Negative   BENZODIAZEPINE UR  Negative   COCAINE UR  Positive*   METHADONE URINE  Negative   OPIATE UR  Negative   PCP UR  Negative   THC UR  Positive*     Results from last 7 days   Lab Units 04/20/22  1654 04/20/22  1653   ETHANOL LVL mg/dL  --  <10   ACETAMINOPHEN LVL ug/mL <54*  --    SALICYLATE LVL mg/dL <1 7*  --          ED Treatment:   Medication Administration from 04/20/2022 1616 to 04/20/2022 1834       Date/Time Order Dose Route Action     04/20/2022 1705 thiamine tablet 100 mg 100 mg Oral Given     21/04/4566 0420 folic acid (FOLVITE) tablet 1 mg 1 mg Oral Given     04/20/2022 1705 multivitamin-minerals (CENTRUM) tablet 1 tablet 1 tablet Oral Given     04/20/2022 1705 amoxicillin-clavulanate (AUGMENTIN) 875-125 mg per tablet 1 tablet 1 tablet Oral Given     04/20/2022 1745 magnesium oxide (MAG-OX) tablet 800 mg 800 mg Oral Given        Past Medical History:   Diagnosis Date    Drug abuse ([place] [address] )     Hepatitis C      Present on Admission:   Benzodiazepine withdrawal without complication ([place] [address] )   Opioid withdrawal ([place] [address] )   Opioid use disorder, severe, dependence ([place] [address] )   IVDU (intravenous drug user)   Tobacco dependence due to cigarettes   Chronic hepatitis C without hepatic coma (HCC)   Moderate benzodiazepine use disorder (HCC)   Anxiety and depression   Alcohol withdrawal syndrome with complication (HCC)   Alcohol use disorder, severe, dependence ([place] [address] )   Hypomagnesemia   Polysubstance abuse ([place] [address] )   Anemia      Admitting Diagnosis: Alcohol withdrawal ([place] [address] ) [F10 239]  Hypomagnesemia [E83 42]  Alcohol abuse [F10 10]  Transaminitis [R74 01]  Polysubstance abuse ([place] [address] ) [F19 10]  Open wound of hand [S61 409A]  Heroin abuse ([place] [address] ) [F11 10]  Admitted to substance misuse detoxification center [Z78 9]  Age/Sex: 32 y o  male  Admission Orders:  Scheduled Medications:  enoxaparin, 40 mg, Subcutaneous, Daily  folic acid, 1 mg, Oral, Daily  multivitamin-minerals, 1 tablet, Oral, Daily  nicotine, 1 patch, Transdermal, Daily  thiamine, 100 mg, Oral, Daily    PHENobarbital 130 mg/mL injection 130 mg  Dose: 130 mg  Freq: Once Route: IV x5 4/21    PHENobarbital 65 mg/mL injection 65 mg  Dose: 65 mg  Freq: Once Route: IV  Start: 04/20/22 1915 x1 4/20  Continuous IV Infusions:    sodium chloride, 125 mL/hr, Intravenous, Continuous      PRN Meds:  acetaminophen, 650 mg, Oral, Q6H PRN  cloNIDine, 0 1 mg, Oral, Q6H PRN  ondansetron, 4 mg, Intravenous, Q6H PRN x1 4/21        IP CONSULT TO CASE MANAGEMENT    Network Utilization Review Department  ATTENTION: Please call with any questions or concerns to [phone] and carefully listen to the prompts so that you are directed to the right person  All voicemails are confidential   Yahir Isbell all requests for admission clinical reviews, approved or denied determinations and any other requests to dedicated fax number below belonging to the campus where the patient is receiving treatment   List of dedicated fax numbers for the Facilities:  [address] DENIALS (Administrative/Medical Necessity) [phone]   [address] (Maternity/NICU/Pediatrics) [phone]   [address]  [address] Medical [place] [address] [id number] [place] [address] [address] [facility] [phone]

## 2022-04-21 NOTE — DISCHARGE INSTR - OTHER ORDERS
For assistance in obtaining Substance Use treatment:    [facility]: [phone]  [place]: [address]: [address]: [phone]  Isela Vasquez 23: [address] [facility] [address], [address] [place] [address] Avenue: [address] [place] [address] North: [phone]  [address]: [phone]    Alcoholics Anonymous: [phone]  Narcotics Anonymous: [phone]       From the [facility] website www pa [email or web address]/opioid-[email or web address]    How do I get naloxone? Family members and friends can access naloxone by:    Obtaining a prescription from their family doctor  Using the standing order issued by Acting Physician Domo DAVIS standing order is a prescription written for the general public, rather than specifically for an individual   To use the standing order, print it and take it with you to the pharmacy or have the digital version on your phone  Download the standing order from the Department of Health (PDF)  If you are unable to print it or use the digital version, the standing order is kept on file at many pharmacies  If a pharmacy does not have it on file, they may have the ability to look it up  Naloxone prescriptions can be filled at most pharmacies  Although the medication might not be available for same-day pickup, it often can be ordered and available within a day or two

## 2022-04-21 NOTE — ASSESSMENT & PLAN NOTE
· Magnesium 1 4, K+ 3 8  · Received 800 mg mag oxide in ED PTA  · Will administer additional supplementation: 2 g magnesium sulfate   · Continue to monitor electrolytes and supplement as indicated

## 2022-04-21 NOTE — ASSESSMENT & PLAN NOTE
· Withdrawal treatment as above  · Interested in inpatient rehab, will likely be ready for placement tomorrow

## 2022-04-21 NOTE — PROGRESS NOTES
PROGRESS NOTE  DEPARTMENT OF MEDICAL TOXICOLOGY  LEVEL 4 MEDICAL DETOX UNIT  Almaz Rush 32 y o  male MRN: [id number]  Unit/Bed#: 5T DETOX 515-01 Encounter: [id number]      Reason for Admission/Principal Problem: Alcohol withdrawal with complication    Rounding Provider: Arlin Lunsford MD  Attending Provider: Arlin Lunsford MD   4/20/2022  4:25 PM         * Alcohol withdrawal syndrome with complication [facility])  Assessment & Plan  · Currently exhibiting evidence of withdrawal  · Giving phenobarbital, 390 mg IV dose now due to worsening symptoms  · Continue to monitor    Opioid withdrawal ([place] [address] )  Assessment & Plan  · Daily IV heroin use- 2 bundles daily  · Last use 4/19 PM  · Currently on SEWS protocol for treatment of alcohol and benzodiazepine withdrawal  · Continuing phenobarbital for now  · Plan to initiate Suboxone treatment tomorrow when 3 days since last opioid use    Benzodiazepine withdrawal without complication ([place] [address] )  Assessment & Plan  · Reports h/o frequent klonopin use   · Reports last use 2 mg  4/17/2022  · Currently exhibiting evidence of withdrawal  · Giving phenobarbital, 390 mg IV dose now due to worsening symptoms  · Continue to monitor    Alcohol use disorder, severe, dependence ([facility] )  Assessment & Plan  Patient with h/o chronic daily alcohol use  Currently consumes fifth vodka daily  Initiate IVFs, daily thiamine/folic acid supplementation  Follow [facility] protocol for medically-assisted alcohol withdrawal as above  Consult case management for assistance with rehab resources- currently interested in inpatient resources on discharge    Moderate benzodiazepine use disorder ([facility] )  Assessment & Plan  · Long-term h/o benzo use since teens  · Reports he uses klonopin 2 mg several times weekly   · Acquires from the streets (not prescribed)  · Last use 4/17/2022  · Notes he primarily takes to "prevent alcohol withdrawal seizures"  · Follow SEWS as above     Opioid use disorder, severe, dependence [facility])  Assessment & Plan  · Patient reports h/o opioid abuse since his teens   · Currently injecting 2 bundles heroin daily   · Reports last use afternoon 4/19/2022  · Notes past treatment with methadone approx   6 yrs ago  · Previously prescribed suboxone 8 mg BID [facility]  · [address] reviewed- most recent script 12/22/2021: suboxone 8-2 mg (28 tabs, 14 days)  · Currently interested in resuming suboxone  · Follow COWS/MAT protocol for medically-assisted opioid withdrawal as above  · Consult case management for assistance with rehab resources- currently interested in inpatient rehab upon discharge     Anemia  Assessment & Plan  · Stable, continue to monitor    Polysubstance abuse ([facility] )  Assessment & Plan  · Reports daily IV heroin use along with IV cocaine use (at least once weekly) and occasionally smokes marijuana  · Presents with multiple skin-picking lesions of face and b/l hands/forearms  · States he believe cocaine he used recently was possibly laced with meth  · UDS + cocaine, THC  · Encourage cessation  · Consult case management- interested in inpatient rehab on discharge    Hypomagnesemia  Assessment & Plan  · Improved this morning  · Continue to monitor    Tobacco dependence due to cigarettes  Assessment & Plan  Current every day smoker: 1 ppd  Encourage cessation  Offer nicotine replacement    Anxiety and depression  Assessment & Plan  · H/o anxiety/depression   · Currently does not take any outpatient medications, does not follow with outpatient psychiatry  · Per chart review, previously prescribed lexapro and atarax  · Currently denies SI, HI  · Recommend outpatient follow-up PCP/psychiatry     IVDU (intravenous drug user)  Assessment & Plan  · Reports current IV drug use of heroin/fentanyl daily and occasional cocaine  · States he will reuse needles, denies sharing needles with others  · Reports he injects only in b/l AC fossae  · Track marks visible b/l AC fossa, no evidence of abscess/acute infection currently   · Also presents with skin-picking lesions to face and b/l hands/forearms (possibly from recent meth use)  · No evidence of acute infection: no diffuse erythema, no drainage, no abscess   · H/o of + hep C, untreated  · UDS 4/20/2022: + cocaine, THC  · Recommend outpatient follow-up with GI for further management of hep C  · Check HIV, acute hepatitis panel  · Encourage cessation    Chronic hepatitis C without hepatic coma ([facility] [address] )  Assessment & Plan  · Patient with h/o untreated hep c in setting of IVDU  · Hepatitis C quantitative PCR 1/26/2022: 993,000  · CMP 4/20/2022:  LFTs , , alk-phos 96  T bili 0 28  · Check update hep C PCR  · Encourage cessation of IVDU  · Recommend outpatient GI follow-up for ongoing monitoring/maintenance          VTE Pharmacologic Prophylaxis:   Pharmacologic: Enoxaparin (Lovenox)  Mechanical VTE Prophylaxis in Place: yes    Code Status: Level 1 - Full Code    Patient Centered Rounds: I have performed bedside rounds with nursing staff today  Discussions with Specialists or Other Care Team Provider: Case management     Education and Discussions with Family / Patient: Discussed with patient    Time Spent for Care: 40 minutes  More than 50% of total time spent on counseling and coordination of care as described above      Minutes of critical care time: 31 minutes  -Critical care time was exclusive of separately billable procedures and teaching time    -Critical care was necessary to treat or prevent imminent or life-threatening deterioration of the following condition: CNS failure/ compromise, toxidrome (alcohol withdrawal, benzodiazepine withdrawal)  -Critical care time was spent personally by me on the following activities as well as the above as per the course and rest of chart: obtaining history from patient/surrogate, review of old charts, development of a treatment plan, discussions with referring provider(s) and/or consultants, examination of the patient, performing treatments and interventions, evaluation of patient's response to the treatment, re-evaluation of the patient's condition, ordering/interpreting laboratory studies, ordering/interpreting of radiographic studies  Current Length of Stay: 1 day(s)    Current Patient Status: Inpatient     Certification Statement: The patient will continue to require additional inpatient hospital stay due to Alcohol withdrawal with complication Discharge Plan: Case management consulted, likely plan for inpatient rehab placement when medically stabilized          Subjective:   Patient continuing to experience significant withdrawal symptoms this morning including anxiety, nausea, tremulousness, body aches  Having some difficulty tolerating p o  due to the nausea      Objective:     Clinical Opiate Withdrawal Scale  Pulse: 66    SEWS Total Score: 0 (2022  8:30 AM)        Last 24 Hours Medication List:   Current Facility-Administered Medications   Medication Dose Route Frequency Provider Last Rate    acetaminophen  650 mg Oral Q6H PRN Venita Defrancisco, PA-C      cloNIDine  0 1 mg Oral Q6H PRN Venita Defrancisco, PA-C      enoxaparin  40 mg Subcutaneous Daily Venita Defrancisco, PA-C      folic acid  1 mg Oral Daily Katrin L Jimenaock, DO      loperamide  2 mg Oral Q4H PRN Hilda Jean MD      multivitamin-minerals  1 tablet Oral Daily Katrin L Jimenaock, DO      nicotine  1 patch Transdermal Daily Venita Deflalacisco, PA-C      ondansetron  4 mg Intravenous Q6H PRN Venita Defrancisco, PA-C      sodium chloride  125 mL/hr Intravenous Continuous Venita Defrancisco, PA-C Stopped (22 3252)    thiamine  100 mg Oral Daily Katrin L Bendock, DO           Vitals:   Temp (24hrs), Av 1 °F (36 7 °C), Min:97 6 °F (36 4 °C), Max:98 6 °F (37 °C)    Temp:  [97 6 °F (36 4 °C)-98 6 °F (37 °C)] 98 1 °F (36 7 °C)  HR:  [54-77] 66  Resp:  [12-18] 18  BP: (104-140)/(55-90) 130/67  SpO2: [96 %-99 %] 98 %  Body mass index is 22 55 kg/m²  Input and Output Summary (last 24 hours): Intake/Output Summary (Last 24 hours) at 4/21/2022 1110  Last data filed at 4/21/2022 0325  Gross per 24 hour   Intake 950 ml   Output --   Net 950 ml       Physical Exam:   Physical Exam  Vitals reviewed  Constitutional:       General: He is not in acute distress  HENT:      Head: Normocephalic and atraumatic  Mouth/Throat:      Mouth: Mucous membranes are moist       Pharynx: Oropharynx is clear  Eyes:      Conjunctiva/sclera: Conjunctivae normal       Pupils: Pupils are equal, round, and reactive to light  Cardiovascular:      Rate and Rhythm: Normal rate and regular rhythm  Heart sounds: No murmur heard  No friction rub  No gallop  Pulmonary:      Effort: Pulmonary effort is normal  No respiratory distress  Breath sounds: Normal breath sounds  Abdominal:      General: There is no distension  Palpations: Abdomen is soft  Tenderness: There is no abdominal tenderness  Musculoskeletal:         General: No swelling  Cervical back: Neck supple  Right lower leg: No edema  Left lower leg: No edema  Skin:     General: Skin is warm and dry  Comments: Track marks present   Neurological:      General: No focal deficit present  Mental Status: He is alert and oriented to person, place, and time  Comments: Mildly tremulous   Psychiatric:      Comments: Mildly anxious appearing         Additional Data:     Labs:   Results from last 7 days   Lab Units 04/21/22  0456 04/20/22  1653 04/20/22  1653   WBC Thousand/uL 6 60   < > 7 33   HEMOGLOBIN g/dL 12 0   < > 11 4*   HEMATOCRIT % 38 8   < > 35 9*   PLATELETS Thousands/uL 390   < > 368   NEUTROS PCT %  --   --  49   LYMPHS PCT %  --   --  37   MONOS PCT %  --   --  10   EOS PCT %  --   --  3    < > = values in this interval not displayed        Results from last 7 days   Lab Units 04/21/22  0457   SODIUM mmol/L 140 POTASSIUM mmol/L 4 0   CHLORIDE mmol/L 105   CO2 mmol/L 33*   BUN mg/dL 14   CREATININE mg/dL 0 77   ANION GAP mmol/L 2*   CALCIUM mg/dL 8 6   ALBUMIN g/dL 3 0   TOTAL BILIRUBIN mg/dL 0 22   ALK PHOS U/L 86   ALT U/L 129*   AST U/L 83*   GLUCOSE RANDOM mg/dL 83                          * I Have Reviewed All Lab Data Listed Above  * Additional Pertinent Lab Tests Reviewed: [address] [facility] Admission Reviewed      Imaging Studies: I have personally reviewed pertinent reports  Recent Cultures (last 7 days): Today, Patient Was Seen By: Judie Lee MD    ** Please Note: Dictation voice to text software may have been used in the creation of this document   **

## 2022-04-21 NOTE — ASSESSMENT & PLAN NOTE
· H/o anxiety/depression   · Currently does not take any outpatient medications, does not follow with outpatient psychiatry  · Per chart review, previously prescribed lexapro and atarax  · Currently denies SI, HI  · Recommend outpatient follow-up PCP/psychiatry

## 2022-04-21 NOTE — PLAN OF CARE
Problem: SUBSTANCE USE/ABUSE  Goal: By discharge, will develop insight into their chemical dependency and sustain motivation to continue in recovery  Description: INTERVENTIONS:  - Attends all daily group sessions and scheduled AA groups  - Actively practices coping skills through participation in the therapeutic community and adherence to program rules  - Reviews and completes assignments from individual treatment plan  - Assist patient development of understanding of their personal cycle of addiction and relapse triggers  Outcome: Progressing  Goal: By discharge, patient will have ongoing treatment plan addressing chemical dependency  Description: INTERVENTIONS:  - Assist patient with resources and/or appointments for ongoing recovery based living  Outcome: Progressing     Problem: Potential for Falls  Goal: Patient will remain free of falls  Description: INTERVENTIONS:  - Educate patient/family on patient safety including physical limitations  - Instruct patient to call for assistance with activity   - Consult OT/PT to assist with strengthening/mobility   - Keep Call bell within reach  - Keep bed low and locked with side rails adjusted as appropriate  - Keep care items and personal belongings within reach  - Initiate and maintain comfort rounds  - Make Fall Risk Sign visible to staff  - Offer Toileting every 2 Hours, in advance of need  - Initiate/Maintain bed alarm  - Apply yellow socks and bracelet for high fall risk patients  - Consider moving patient to room near nurses station  Outcome: Progressing

## 2022-04-21 NOTE — ASSESSMENT & PLAN NOTE
H/o chronic daily alcohol consumption, reports h/o withdrawal seizures  Last drink afternoon 4/19/2022  Ethanol <10 (4/20/2022 1654)  Follow SEWS protocol with symptom-triggered phenobarbital for medically-assisted alcohol withdrawal  Current symptoms include nausea, anxiety  Administer 65 mg phenobarbital   Continue to monitor vitals, symptoms

## 2022-04-21 NOTE — ASSESSMENT & PLAN NOTE
· Long-term h/o benzo use since teens  · Reports he uses klonopin 2 mg several times weekly   · Acquires from the streets (not prescribed)  · Last use 4/17/2022  · Notes he primarily takes to "prevent alcohol withdrawal seizures"  · Follow SEWS as above

## 2022-04-21 NOTE — ASSESSMENT & PLAN NOTE
· CBC 4/20/2022 revealed hgb 11 4, MCV 92  · Per chart review, baseline hgb appears to be 12-13  · No evidence of acute bleeding   · Initiate thiamine and folic acid supplementation  · Continue to monitor, repeat CBC in AM

## 2022-04-21 NOTE — ASSESSMENT & PLAN NOTE
· Patient reports h/o opioid abuse since his teens   · Currently injecting 2 bundles heroin daily   · Reports last use afternoon 4/19/2022  · Notes past treatment with methadone approx   6 yrs ago  · Previously prescribed suboxone 8 mg BID via [facility]  · [address] reviewed- most recent script 12/22/2021: suboxone 8-2 mg (28 tabs, 14 days)  · Currently interested in resuming suboxone  · Follow COWS/MAT protocol for medically-assisted opioid withdrawal as above  · Consult case management for assistance with rehab resources- currently interested in inpatient rehab upon discharge

## 2022-04-21 NOTE — ASSESSMENT & PLAN NOTE
· Daily IV heroin use- 2 bundles daily  · Reports last use 4/19/2022 (afternoon)  · Follow COWS/MAT protocol for medically-assisted opioid withdrawal  · Will transition to COWS once stable from alcohol/benzo withdrawal  · Continue supportive care

## 2022-04-21 NOTE — ASSESSMENT & PLAN NOTE
· Patient with h/o untreated hep c in setting of IVDU  · Hepatitis C quantitative PCR 1/26/2022: 993,000  · CMP 4/20/2022:  LFTs , , alk-phos 96   T bili 0 28  · Check update hep C PCR  · Encourage cessation of IVDU  · Recommend outpatient GI follow-up for ongoing monitoring/maintenance

## 2022-04-21 NOTE — ASSESSMENT & PLAN NOTE
· Reports daily IV heroin use along with IV cocaine use (at least once weekly) and occasionally smokes marijuana  · Presents with multiple skin-picking lesions of face and b/l hands/forearms  · States he believe cocaine he used recently was possibly laced with meth  · UDS + cocaine, THC  · Encourage cessation  · Consult case management- interested in inpatient rehab on discharge

## 2022-04-22 VITALS
HEART RATE: 68 BPM | HEIGHT: 67 IN | WEIGHT: 144 LBS | BODY MASS INDEX: 22.6 KG/M2 | OXYGEN SATURATION: 98 % | TEMPERATURE: 99.3 F | DIASTOLIC BLOOD PRESSURE: 79 MMHG | SYSTOLIC BLOOD PRESSURE: 129 MMHG | RESPIRATION RATE: 18 BRPM

## 2022-04-22 PROBLEM — F13.230 BENZODIAZEPINE WITHDRAWAL WITHOUT COMPLICATION (HCC): Status: RESOLVED | Noted: 2021-12-24 | Resolved: 2022-04-22

## 2022-04-22 PROBLEM — F10.239 ALCOHOL WITHDRAWAL SYNDROME WITH COMPLICATION (HCC): Status: RESOLVED | Noted: 2022-04-20 | Resolved: 2022-04-22

## 2022-04-22 PROBLEM — F11.93 OPIOID WITHDRAWAL (HCC): Status: RESOLVED | Noted: 2021-12-24 | Resolved: 2022-04-22

## 2022-04-22 PROBLEM — F13.930 BENZODIAZEPINE WITHDRAWAL WITHOUT COMPLICATION (HCC): Status: RESOLVED | Noted: 2021-12-24 | Resolved: 2022-04-22

## 2022-04-22 PROBLEM — F11.23 OPIOID WITHDRAWAL (HCC): Status: RESOLVED | Noted: 2021-12-24 | Resolved: 2022-04-22

## 2022-04-22 PROBLEM — F10.939 ALCOHOL WITHDRAWAL SYNDROME WITH COMPLICATION (HCC): Status: RESOLVED | Noted: 2022-04-20 | Resolved: 2022-04-22

## 2022-04-22 PROBLEM — E83.42 HYPOMAGNESEMIA: Status: RESOLVED | Noted: 2022-04-20 | Resolved: 2022-04-22

## 2022-04-22 LAB
HCV RNA SERPL NAA+PROBE-ACNC: NORMAL IU/ML
HCV RNA SERPL NAA+PROBE-LOG IU: 6.24 LOG10 IU/ML
TEST INFORMATION: NORMAL

## 2022-04-22 PROCEDURE — NC001 PR NO CHARGE: Performed by: EMERGENCY MEDICINE

## 2022-04-22 PROCEDURE — 99238 HOSP IP/OBS DSCHRG MGMT 30/<: CPT | Performed by: EMERGENCY MEDICINE

## 2022-04-22 RX ORDER — BUPRENORPHINE AND NALOXONE 8; 2 MG/1; MG/1
8 FILM, SOLUBLE BUCCAL; SUBLINGUAL 2 TIMES DAILY
Qty: 28 FILM | Refills: 0 | Status: SHIPPED | OUTPATIENT
Start: 2022-04-22 | End: 2022-05-06

## 2022-04-22 RX ORDER — BUPRENORPHINE AND NALOXONE 8; 2 MG/1; MG/1
8 FILM, SOLUBLE BUCCAL; SUBLINGUAL 2 TIMES DAILY
Status: DISCONTINUED | OUTPATIENT
Start: 2022-04-22 | End: 2022-04-22 | Stop reason: HOSPADM

## 2022-04-22 RX ORDER — BUPRENORPHINE AND NALOXONE 2; .5 MG/1; MG/1
4 FILM, SOLUBLE BUCCAL; SUBLINGUAL ONCE
Status: COMPLETED | OUTPATIENT
Start: 2022-04-22 | End: 2022-04-22

## 2022-04-22 RX ADMIN — BUPRENORPHINE AND NALOXONE 4 MG: 2; .5 FILM BUCCAL; SUBLINGUAL at 11:38

## 2022-04-22 RX ADMIN — BUPRENORPHINE AND NALOXONE 8 MG: 8; 2 FILM BUCCAL; SUBLINGUAL at 10:13

## 2022-04-22 RX ADMIN — ONDANSETRON 4 MG: 2 INJECTION INTRAMUSCULAR; INTRAVENOUS at 05:33

## 2022-04-22 RX ADMIN — CLONIDINE HYDROCHLORIDE 0.1 MG: 0.1 TABLET ORAL at 05:33

## 2022-04-22 NOTE — DISCHARGE SUMMARY
Discharging Physician / Practitioner: Nicholas Quinn MD  PCP: Camron Sigala MD  Admission Date:   Admission Orders (From admission, onward)     Ordered        04/20/22 1752  INPATIENT ADMISSION  Once                      Discharge Date: 04/22/22    Medical Problems             Resolved Problems  Date Reviewed: 4/20/2022          Resolved    Benz[facility]iazepine withdrawal [facility] ) 2/64/2750     Resolved by  Nicholas Quinn MD    Opioid withdrawal [facility]) 4/22/2022     Resolved by  Nicholas Quinn MD    * (Principal) Alcohol withdrawal syndrome with complication ([facility] ) 2/51/8083     Resolved by  Nicholas Quinn MD    Hypomagnesemia 4/22/2022     Resolved by  Nicholas Quinn MD                Consultations During Hospital Stay:  · None    Procedures Performed:   · None    Significant Findings / Test Results:   · Chronic anemia, known history of hepatitis C    Incidental Findings:   · None    Test Results Pending at Discharge (will require follow up): · None     Outpatient Tests Requested:  · None    Complications:  None      Reason for Admission: Alcohol withdrawal, benzodiazepine withdrawal, opioid withdrawal      Hospital Course:     Gris Boland is a 32 y o  male patient who originally presented to the hospital on 4/20/2022 due withdrawal from alcohol, benzodiazepines and opioids  Patient expressed interest in discontinuing use of all of these  He was initially treated for alcohol and benzodiazepine withdrawal, receiving a total of 845 mg of phenobarbital   Suboxone induction completed today with total of 12 mg of Suboxone  Patient had initially expressed interest in inpatient rehab  However, this evening the patient stated he was feeling completely better and wanted to leave right away  He states he has a few Suboxone at home and can take an 8 mg dose tonight and again tomorrow morning    I provided a prescription for 2 weeks of Suboxone, 8 mg b i d  which he can  at the pharmacy tomorrow  Provided a referral to [facility] program for continued Suboxone prescribing and other resources for polysubstance use disorder  Instructed the patient to follow up with primary care as well  Please see above list of diagnoses and related plan for additional information  Condition at Discharge: good     Discharge Day Visit / Exam:     * Please refer to separate progress note for these details *      Discharge instructions/Information to patient and family:   See after visit summary for information provided to patient and family  Provisions for Follow-Up Care:  See after visit summary for information related to follow-up care and any pertinent home health orders  Disposition:     Home    For Discharges to [facility]:   · Not Applicable to this Patient - Not Applicable to this Patient    Planned Readmission: No     Discharge Statement:  I spent 30 minutes discharging the patient  This time was spent on the day of discharge  I had direct contact with the patient on the day of discharge  Greater than 50% of the total time was spent examining patient, answering all patient questions, arranging and discussing plan of care with patient as well as directly providing post-discharge instructions  Additional time then spent on discharge activities  Discharge Medications:  See after visit summary for reconciled discharge medications provided to patient and family        ** Please Note: This note has been constructed using a voice recognition system **

## 2022-04-22 NOTE — PLAN OF CARE
Problem: SUBSTANCE USE/ABUSE  Goal: By discharge, will develop insight into their chemical dependency and sustain motivation to continue in recovery  Description: INTERVENTIONS:  - Attends all daily group sessions and scheduled AA groups  - Actively practices coping skills through participation in the therapeutic community and adherence to program rules  - Reviews and completes assignments from individual treatment plan  - Assist patient development of understanding of their personal cycle of addiction and relapse triggers  4/22/2022 1826 by Kvng Richards RN  Outcome: Adequate for Discharge  4/22/2022 0701 by Kvng Richards RN  Outcome: Progressing  Goal: By discharge, patient will have ongoing treatment plan addressing chemical dependency  Description: INTERVENTIONS:  - Assist patient with resources and/or appointments for ongoing recovery based living  4/22/2022 1826 by Kvng Richards RN  Outcome: Adequate for Discharge  4/22/2022 0701 by Kvng Richards RN  Outcome: Progressing     Problem: Potential for Falls  Goal: Patient will remain free of falls  Description: INTERVENTIONS:  - Educate patient/family on patient safety including physical limitations  - Instruct patient to call for assistance with activity   - Consult OT/PT to assist with strengthening/mobility   - Keep Call bell within reach  - Keep bed low and locked with side rails adjusted as appropriate  - Keep care items and personal belongings within reach  - Initiate and maintain comfort rounds  - Make Fall Risk Sign visible to staff  - Apply yellow socks and bracelet for high fall risk patients  - Consider moving patient to room near nurses station  4/22/2022 1826 by Kvng Richards RN  Outcome: Adequate for Discharge  4/22/2022 0701 by Kvng Richards RN  Outcome: Progressing

## 2022-04-22 NOTE — NURSING NOTE
Patient IV had been removed by patient  Patient given verbal and written DC instructions  He had no questions at discharge  He has his belongings

## 2022-04-22 NOTE — PROGRESS NOTES
PROGRESS NOTE  DEPARTMENT OF MEDICAL TOXICOLOGY  LEVEL 4 MEDICAL DETOX UNIT  Nate Cronin 32 y o  male MRN: [id number]  Unit/Bed#: 5T DETOX 515-01 Encounter: [id number]      Reason for Admission/Principal Problem: Multiple withdrawal syndromes    Rounding Provider: Clyde Valera MD  Attending Provider: Clyde Valera MD   4/20/2022  4:25 PM         * Alcohol withdrawal syndrome with complication [facility])  Assessment & Plan  · Received total of 845 mg of phenobarbital, last dose yesterday  · Withdrawal appears to be well controlled at this time  · Anticipate discharge to inpatient rehab tomorrow if remains well    Opioid withdrawal ([facility] )  Assessment & Plan  · Had significant opioid withdrawal this morning   · Suboxone induction completed with 12 mg  · Will start maintenance dosing at 8 mg BID tonight  · Anticipate discharge to inpatient rehab tomorrow if remains well    Benzodiazepine withdrawal without complication ([facility] )  Assessment & Plan  · Received total of 845 mg of phenobarbital, last dose yesterday  · Withdrawal appears to be well controlled at this time  · Anticipate discharge to inpatient rehab tomorrow if remains well    Alcohol use disorder, severe, dependence ([facility] )  Assessment & Plan  Interested in inpatient rehab, will likely be ready for placement tomorrow    Moderate benzodiazepine use disorder ([facility] )  Assessment & Plan  · Interested in inpatient rehab, will likely be ready for placement tomorrow    Opioid use disorder, severe, dependence ([facility] )  Assessment & Plan  · Withdrawal treatment as above  · Interested in inpatient rehab, will likely be ready for placement tomorrow    Anemia  Assessment & Plan  · Stable  · Primary care follow-up    Polysubstance abuse ([facility] )  Assessment & Plan  · Reports daily IV heroin use along with IV cocaine use (at least once weekly) and occasionally smokes marijuana  · Presents with multiple skin-picking lesions of face and b/l hands/forearms  · States he believe cocaine he used recently was possibly laced with meth  · UDS + cocaine, THC  · Encourage cessation  · Consult case management- interested in inpatient rehab on discharge    Hypomagnesemia  Assessment & Plan  · Resolved    Tobacco dependence due to cigarettes  Assessment & Plan  Current every day smoker: 1 ppd  Encourage cessation  Offer nicotine replacement    Anxiety and depression  Assessment & Plan  · H/o anxiety/depression   · Currently does not take any outpatient medications, does not follow with outpatient psychiatry  · Per chart review, previously prescribed lexapro and atarax  · Currently denies SI, HI  · Recommend outpatient follow-up PCP/psychiatry     IVDU (intravenous drug user)  Assessment & Plan  · Treatment as above  · Encourage cessation    Chronic hepatitis C without hepatic coma ([facility] )  Assessment & Plan  · Patient with h/o untreated hep c in setting of IVDU  · Hepatitis C quantitative PCR 1/26/2022: 993,000  · CMP 4/20/2022:  LFTs , , alk-phos 96  T bili 0 28  · Check update hep C PCR  · Encourage cessation of IVDU  · Recommend outpatient GI follow-up for ongoing monitoring/maintenance          VTE Pharmacologic Prophylaxis:   Pharmacologic: Enoxaparin (Lovenox)  Mechanical VTE Prophylaxis in Place: yes    Code Status: Level 1 - Full Code    Patient Centered Rounds: I have performed bedside rounds with nursing staff today  Discussions with Specialists or Other Care Team Provider: Case management     Education and Discussions with Family / Patient: Discussed with patient    Time Spent for Care: 30 minutes  More than 50% of total time spent on counseling and coordination of care as described above      Current Length of Stay: 2 day(s)    Current Patient Status: Inpatient     Certification Statement: The patient will continue to require additional inpatient hospital stay due to Multiple withdrawal states Discharge Plan: Likely discharge to inpatient rehab tomorrow Subjective:   Patient did well overnight but this morning states he feels he is now in significant opioid withdrawal, with aches and chills, nausea, anxiety, pilorerection, congestion  (This afternoon resting comfortably with no further withdrawal symptoms s/p Suboxone induction)      Objective:     Clinical Opiate Withdrawal Scale  Pulse: 68  Resting Pulse Rate: Measured After Patient is Sitting or Lying for One Minute: Pulse rate 80 or below  GI Upset: Over Last Half Hour: Nausea or loose stool  Sweating: Over Past Half Hour Not Accounted for by Room Temperature of Patient Activity: Flushed or observable moistness on face  Tremor: Observation of Outstretched Hands: Tremor can be felt, but not observed  Restlessness: Observation During Assessment: Reports difficulty sitting still, but is able to do so  Yawning: Observation During Assessment: Yawning once or twice during assessment  Pupil Size: Pupils pinned or normal size for room light  Anxiety and Irritability: Patient obviously irritable or anxious  Bone or Joint Aches: If Patient was Having Pain Previously, Only the Additional Component Attributed to Opiate Withdrawal is Scored: Mild diffuse discomfort  Gooseflesh Skin: Piloerection of skin can be felt or hairs standing up on arms  Runny Nose or Tearing: Not Accounted for by Cold Symptoms or Allergies: Nasal stuffiness of unusually moist eyes  Clinical Opiate Withdrawal Scale Total Score: 14    SEWS Total Score: 0 (4/21/2022 11:14 PM)        Last 24 Hours Medication List:   Current Facility-Administered Medications   Medication Dose Route Frequency Provider Last Rate    acetaminophen  650 mg Oral Q6H PRN Venita Chasecicristiano, PA-C      buprenorphine-naloxone  8 mg Sublingual BID Obioma Catracho Bishop PA-C      cloNIDine  0 1 mg Oral Q6H PRN Venita Defrancisco, PA-C      enoxaparin  40 mg Subcutaneous Daily Venita Defrancisco, PA-C      folic acid  1 mg Oral Daily Katrin Yusuf DO      loperamide  2 mg Oral Q4H PRN Evelyn Downing MD      multivitamin-minerals  1 tablet Oral Daily Paul Jemmashamika Yusuf, DO      nicotine  1 patch Transdermal Daily Venita Goetz PA-C      ondansetron  4 mg Intravenous Q6H PRN Venita Goetz PA-C      thiamine  100 mg Oral Daily Katrin Yusuf, DO           Vitals:   Temp (24hrs), Av 5 °F (36 9 °C), Min:97 8 °F (36 6 °C), Max:99 3 °F (37 4 °C)    Temp:  [97 8 °F (36 6 °C)-99 3 °F (37 4 °C)] 99 3 °F (37 4 °C)  HR:  [56-75] 68  Resp:  [18] 18  BP: (113-143)/(68-91) 129/79  SpO2:  [92 %-100 %] 98 %  Body mass index is 22 55 kg/m²  Input and Output Summary (last 24 hours): Intake/Output Summary (Last 24 hours) at 2022 1516  Last data filed at 2022 1300  Gross per 24 hour   Intake 720 ml   Output 600 ml   Net 120 ml       Physical Exam:   Physical Exam  Vitals reviewed  Constitutional:       Comments: Uncomfortable appearing   HENT:      Head: Normocephalic and atraumatic  Mouth/Throat:      Mouth: Mucous membranes are moist       Pharynx: Oropharynx is clear  Eyes:      Conjunctiva/sclera: Conjunctivae normal       Pupils: Pupils are equal, round, and reactive to light  Cardiovascular:      Rate and Rhythm: Normal rate and regular rhythm  Heart sounds: No murmur heard  No friction rub  No gallop  Pulmonary:      Effort: Pulmonary effort is normal  No respiratory distress  Breath sounds: Normal breath sounds  Abdominal:      General: There is no distension  Palpations: Abdomen is soft  Tenderness: There is no abdominal tenderness  Musculoskeletal:         General: No swelling  Cervical back: Neck supple  Right lower leg: No edema  Left lower leg: No edema  Skin:     General: Skin is warm and dry  Comments: + piloerection   Neurological:      General: No focal deficit present  Mental Status: He is alert and oriented to person, place, and time     Psychiatric:      Comments: Anxious         Additional Data:     Labs:   Results from last 7 days   Lab Units 04/21/22  0456 04/20/22  1653 04/20/22  1653   WBC Thousand/uL 6 60   < > 7 33   HEMOGLOBIN g/dL 12 0   < > 11 4*   HEMATOCRIT % 38 8   < > 35 9*   PLATELETS Thousands/uL 390   < > 368   NEUTROS PCT %  --   --  49   LYMPHS PCT %  --   --  37   MONOS PCT %  --   --  10   EOS PCT %  --   --  3    < > = values in this interval not displayed  Results from last 7 days   Lab Units 04/21/22  0457   SODIUM mmol/L 140   POTASSIUM mmol/L 4 0   CHLORIDE mmol/L 105   CO2 mmol/L 33*   BUN mg/dL 14   CREATININE mg/dL 0 77   ANION GAP mmol/L 2*   CALCIUM mg/dL 8 6   ALBUMIN g/dL 3 0   TOTAL BILIRUBIN mg/dL 0 22   ALK PHOS U/L 86   ALT U/L 129*   AST U/L 83*   GLUCOSE RANDOM mg/dL 83                          * I Have Reviewed All Lab Data Listed Above  * Additional Pertinent Lab Tests Reviewed: Cristof[facility] Admission Reviewed      Imaging Studies: I have personally reviewed pertinent reports  Recent Cultures (last 7 days): Today, Patient Was Seen By: Kylah John MD    ** Please Note: Dictation voice to text software may have been used in the creation of this document   **

## 2022-04-22 NOTE — DISCHARGE INSTRUCTIONS
A prescription for Suboxone has been sent to the [address] [place] [address] Drive  Take it as prescribed  We have placed a referral for the [facility] program, and they should be contacting you to arrange an appointment  This program can assist with continued Suboxone prescribing and with other resources for help in remaining sober  If you don't hear from them by the middle of next week please give them a call, their information is circled on your discharge instructions  Alcohol Dependence   WHAT YOU NEED TO KNOW:   Alcohol dependence is the need to drink alcohol often to function in your daily life  DISCHARGE INSTRUCTIONS:   Call your local emergency number (911 in the [address] [place] [address] Rd[address]) if:   · You have a seizure  Call your doctor if:   · Your heart is beating faster than normal     · You have hallucinations  · You cannot remember what happens while you are drinking  · You are anxious and have nausea  · Your hands are shaky and you are sweating heavily  · You have questions or concerns about your condition or care  Alcohol dependence  includes at least 3 of the following during the past 12 months:  · You keep drinking alcohol even if you know it increases your risk for health problems  Health problems include liver problems, stomach ulcers, high blood pressure, and stroke  · You develop a tolerance for alcohol  Tolerance means the amount of alcohol you usually drink no longer causes the effects you desire  You need to drink even more alcohol to get the same effect  · You have physical or mental withdrawal symptoms after not drinking for a short period  The same amount of alcohol is needed to relieve or prevent withdrawal symptoms  You may also have to drink to stop tremors (shakes) or to cure a hangover  · You crave alcohol  You have a desire to drink more often and to drink larger amounts of alcohol  · You have problems managing alcohol use  You are not able to control your drinking habits   You keep going back to drinking even after you quit  · You spend less time doing more important things  You spend much of your time drinking alcohol or being with people who also drink  You have trouble with social or daily activities at school, work, or home  You often choose events or activities that will include drinking  Do not try to stop drinking on your own: Your healthcare provider will help you withdraw from alcohol safely  He or she may need to admit you to the hospital  You may also need any of the following treatments:  · Medicines to decrease your craving for alcohol    · Support groups such as Alcoholics Anonymous     · Psychiatrist or psychologist for therapy     · Admission to an inpatient facility for treatment for severe dependence    For support and more information:   · Alcoholics Anonymous  Web Address: http://[employer]/    · Substance Abuse and [address] , [address] [place]  Web Address: https://[employer]/    Follow up with your healthcare provider as directed:  Write down your questions so you remember to ask them during your visits  © Copyright [employer] 2022 Information is for End User's use only and may not be sold, redistributed or otherwise used for commercial purposes  All illustrations and images included in CareNotes® are the copyrighted property of A D A M , Inc  or [address] [place] [address]  The above information is an  only  It is not intended as medical advice for individual conditions or treatments  Talk to your doctor, nurse or pharmacist before following any medical regimen to see if it is safe and effective for you  Opioid Dependence   WHAT YOU NEED TO KNOW:   Opioids are medicines, such as morphine and codeine, used to treat pain  Dependence happens after you have used opioids regularly for a long period of time  Dependence means that your body gets used to how much medicine you take   Dependence is not the same as addiction  Addiction means that a person uses opioids to get high instead of using them to control pain  DISCHARGE INSTRUCTIONS:   Medicines: You may  need the following:  · NSAIDs or other pain medicines  help decrease pain  NSAIDs are available without a doctor's order  Ask your healthcare provider which medicine is right for you  Ask how much to take and when to take it  Take as directed  NSAIDs can cause stomach bleeding and kidney problems if not taken correctly  · Maintenance therapy medicine  is another type of opioid that replaces the opioid you are dependent on  · Take your medicine as directed  Contact your healthcare provider if you think your medicine is not helping or if you have side effects  Tell him or her if you are allergic to any medicine  Keep a list of the medicines, vitamins, and herbs you take  Include the amounts, and when and why you take them  Bring the list or the pill bottles to follow-up visits  Carry your medicine list with you in case of an emergency  Follow up with your healthcare provider or pain specialist as directed: You may need to return for other tests  You may also be referred to a specialty clinic to receive maintenance therapy medicine on a regular basis  Write down your questions so you remember to ask them during your visits  Psychological counseling and support: Your healthcare provider may recommend that you receive psychological counseling as part of your treatment plan  A specially trained healthcare provider will speak with you about your opioid dependence  They will help you find ways to become less dependent on opioids  They may also help you find resources for any daily living needs you have, such as housing or employment  Contact your healthcare provider if:   · Your speech is slurred  · You have difficulty staying awake  · You have nausea and vomiting  · You are easily upset or cry easily  · You have poor balance       · You have questions or concerns about your condition or care  Seek care immediately or call 911 if:   · You feel lightheaded or faint  · You have a fast, slow, or irregular heartbeat  · You have a seizure  © 2017 [address] Information is for End User's use only and may not be sold, redistributed or otherwise used for commercial purposes  All illustrations and images included in CareNotes® are the copyrighted property of A D A M , Inc  or Piero Parisi  The above information is an  only  It is not intended as medical advice for individual conditions or treatments  Talk to your doctor, nurse or pharmacist before following any medical regimen to see if it is safe and effective for you

## 2022-04-25 LAB
HAV IGM SER QL: ABNORMAL
HBV CORE IGM SER QL: ABNORMAL
HBV SURFACE AG SER QL: ABNORMAL
HCV AB SER QL: ABNORMAL

## 2022-04-25 NOTE — UTILIZATION REVIEW
Notification of Discharge   This is a Notification of Discharge from our facility [address] [place] [address]  Please be advised that this patient has been discharge from our facility  Below you will find the admission and discharge date and time including the patients disposition  UTILIZATION REVIEW CONTACT:  Choco Sosa MA  Utilization   Network Utilization Review Department  Phone: [phone] x carefully listen to the prompts  All voicemails are confidential   Email: [email or web address]     PHYSICIAN ADVISORY SERVICES:  FOR [facility] REVIEW - MEDICAL NECESSITY DENIAL  Phone: [phone]  Fax: [phone]  Email: [email or web address]     PRESENTATION DATE: 4/20/2022  4:25 PM  OBERVATION ADMISSION DATE:   INPATIENT ADMISSION DATE: 4/20/22  5:52 PM   DISCHARGE DATE: 4/22/2022  6:44 PM  DISPOSITION: Home/Self Care Home/Self Care      IMPORTANT INFORMATION:  Send all requests for admission clinical reviews, approved or denied determinations and any other requests to dedicated fax number below belonging to the campus where the patient is receiving treatment   List of dedicated fax numbers:  [address] [place] [address] DENIALS (Administrative/Medical Necessity) [phone]   [address]  (Maternity/NICU/Pediatrics) [phone]   Nicole Garner [phone]   [address] [facility] [phone]   [address] [place] [address] [phone]   [address] [place] [address] [address] [place] [address],[address] [phone]   [facility]  [phone]   [address] [facility]  [address] [facility] [address] [facility] [phone]

## 2022-05-25 ENCOUNTER — HOSPITAL ENCOUNTER (EMERGENCY)
Facility: HOSPITAL | Age: 32
Discharge: HOME | End: 2022-05-25
Attending: EMERGENCY MEDICINE | Admitting: EMERGENCY MEDICINE
Payer: MEDICARE

## 2022-05-25 DIAGNOSIS — Z01.89 VISIT FOR LABORATORY TEST: Primary | ICD-10-CM

## 2022-05-25 LAB
HBV SURFACE AG SER QL: NORMAL
HCV AB SER QL: ABNORMAL
HIV 1+2 AB+HIV1 P24 AG SERPL QL IA: NORMAL
HIV1 P24 AG SER QL: NORMAL

## 2022-05-25 PROCEDURE — 86803 HEPATITIS C AB TEST: CPT | Performed by: EMERGENCY MEDICINE

## 2022-05-25 PROCEDURE — 87340 HEPATITIS B SURFACE AG IA: CPT | Performed by: EMERGENCY MEDICINE

## 2022-05-25 PROCEDURE — 99281 EMR DPT VST MAYX REQ PHY/QHP: CPT | Performed by: EMERGENCY MEDICINE

## 2022-05-25 PROCEDURE — 87806 HIV AG W/HIV1&2 ANTB W/OPTIC: CPT | Performed by: EMERGENCY MEDICINE

## 2022-05-25 PROCEDURE — 36415 COLL VENOUS BLD VENIPUNCTURE: CPT | Performed by: EMERGENCY MEDICINE

## 2022-05-25 PROCEDURE — 99282 EMERGENCY DEPT VISIT SF MDM: CPT

## 2022-05-25 NOTE — ED NOTES
Vitals not taken on pt as he wanted to leave as quick as possible and he was only here for a blood draw       Andrea Guardado RN  05/25/22 3680

## 2022-05-25 NOTE — ED PROVIDER NOTES
History  No chief complaint on file  42-year-old male brought in for legal blood draw, history of IVDA, no known HIV  Patient signed in as the source patient as during his legal blood draw the tech had a needlestick with a dirty needle  No medical complaints          Cannot display prior to admission medications because the patient has not been admitted in this contact  Past Medical History:   Diagnosis Date    Drug abuse ([facility] )     Hepatitis C        Past Surgical History:   Procedure Laterality Date    TONSILLECTOMY         No family history on file  I have reviewed and agree with the history as documented  E-Cigarette/Vaping     E-Cigarette/Vaping Substances    Nicotine No     THC No     CBD No     Flavoring No     Other No     Unknown No      Social History     Tobacco Use    Smoking status: Current Every Day Smoker     Packs/day: 1 00    Smokeless tobacco: Never Used   Substance Use Topics    Alcohol use: Yes     Comment: 1/5 vodka    Drug use: Yes     Types: Benzodiazepines, Heroin, Marijuana, Cocaine       Review of Systems   All other systems reviewed and are negative  Physical Exam  Physical Exam  Constitutional:       Appearance: Normal appearance  HENT:      Head: Normocephalic and atraumatic  Cardiovascular:      Rate and Rhythm: Normal rate  Pulmonary:      Effort: Pulmonary effort is normal    Skin:     General: Skin is warm and dry  Neurological:      General: No focal deficit present  Mental Status: He is alert  Psychiatric:         Mood and Affect: Mood normal          Vital Signs  ED Triage Vitals   Temp Pulse Resp BP SpO2   -- -- -- -- --      Temp src Heart Rate Source Patient Position - Orthostatic VS BP Location FiO2 (%)   -- -- -- -- --      Pain Score       --           There were no vitals filed for this visit        Visual Acuity      ED Medications  Medications - No data to display    Diagnostic Studies  Results Reviewed     Procedure Component Value Units Date/Time    Rapid HIV 1/2 AB-AG Combo [[id number]]     Lab Status: No result Specimen: Blood     Hepatitis B surface antigen [[id number]]     Lab Status: No result Specimen: Blood     Hepatitis C antibody [[id number]]     Lab Status: No result Specimen: Blood                  No orders to display              Procedures  Procedures         ED Course                                             MDM  Number of Diagnoses or Management Options  Diagnosis management comments: 49-year-old male signed in as source patient for blood draw in setting of being the source of the needlestick during legal blood draw      Disposition  Final diagnoses:   None     ED Disposition     None      Follow-up Information    None         Patient's Medications    No medications on file       No discharge procedures on file      PDMP Review       Value Time User    PDMP Reviewed  Yes 4/20/2022  9:37 PM Avis Masters PA-C          ED Provider  Electronically Signed by           Heber Mathur DO  05/25/22 9223

## 2023-11-15 DIAGNOSIS — Z31.49 PROCREATIVE INVESTIGATION AND TESTING: Primary | ICD-10-CM

## 2023-11-21 ENCOUNTER — APPOINTMENT (OUTPATIENT)
Dept: LAB | Facility: CLINIC | Age: 33
End: 2023-11-21
Payer: COMMERCIAL

## 2023-11-21 DIAGNOSIS — Z31.49 PROCREATIVE INVESTIGATION AND TESTING: ICD-10-CM

## 2023-11-21 LAB — LITTLE C AG RBC QL: POSITIVE

## 2023-11-21 PROCEDURE — 36415 COLL VENOUS BLD VENIPUNCTURE: CPT

## 2024-05-08 ENCOUNTER — OFFICE VISIT (OUTPATIENT)
Dept: URGENT CARE | Facility: MEDICAL CENTER | Age: 34
End: 2024-05-08
Payer: COMMERCIAL

## 2024-05-08 VITALS
DIASTOLIC BLOOD PRESSURE: 71 MMHG | OXYGEN SATURATION: 99 % | BODY MASS INDEX: 23.54 KG/M2 | HEART RATE: 58 BPM | RESPIRATION RATE: 18 BRPM | WEIGHT: 150 LBS | TEMPERATURE: 97.8 F | HEIGHT: 67 IN | SYSTOLIC BLOOD PRESSURE: 116 MMHG

## 2024-05-08 DIAGNOSIS — K04.7 DENTAL ABSCESS: Primary | ICD-10-CM

## 2024-05-08 PROCEDURE — 99213 OFFICE O/P EST LOW 20 MIN: CPT

## 2024-05-08 PROCEDURE — S9088 SERVICES PROVIDED IN URGENT: HCPCS

## 2024-05-08 RX ORDER — IBUPROFEN 600 MG/1
600 TABLET ORAL EVERY 6 HOURS PRN
Qty: 30 TABLET | Refills: 0 | Status: SHIPPED | OUTPATIENT
Start: 2024-05-08

## 2024-05-08 RX ORDER — AMOXICILLIN AND CLAVULANATE POTASSIUM 875; 125 MG/1; MG/1
1 TABLET, FILM COATED ORAL EVERY 12 HOURS SCHEDULED
Qty: 20 TABLET | Refills: 0 | Status: SHIPPED | OUTPATIENT
Start: 2024-05-08 | End: 2024-05-18

## 2024-05-08 NOTE — PATIENT INSTRUCTIONS
Prescribed course of Augmentin and ibuprofen, take as directed. May also utilize over the counter Tylenol.  Follow up with oral surgery.    Follow up with PCP in 3-5 days.  Proceed to  ER if symptoms worsen.    If tests are performed, our office will contact you with results only if changes need to made to the care plan discussed with you at the visit. You can review your full results on [facility].    Dental Abscess   AMBULATORY CARE:   A dental abscess  is a collection of pus in or around a tooth. A dental abscess is caused by bacteria. The bacteria can enter the tooth when the enamel (outer part of the tooth) is damaged by tooth decay. Bacteria can also enter the tooth through a chip in the tooth or a cut in the gum. Food particles that are stuck between the teeth for a long time may also lead to an abscess.        Common signs and symptoms:   Toothache, a loose tooth, or a tooth that is very sensitive to pressure or temperature    Bad breath, unpleasant taste, and drooling    Fever    Pain, redness, and swelling of the gums, or swelling of your face and neck    Pain when you open or close your mouth    Trouble opening your mouth    Seek care immediately if:   You have severe pain in your tooth or jaw.    You have trouble breathing because of pain or swelling.    Call your doctor if:   Your symptoms get worse, even after treatment.    Your mouth is bleeding.    You cannot eat or drink because of pain or swelling.    Your abscess returns.    You have an injury that causes a crack in your tooth.    You have questions or concerns about your condition or care.    Treatment:  You may  need any of the following:  Medicines  may be given to treat a bacterial infection and decrease pain.     Incision and drainage  is a cut in the abscess to allow the pus to drain. A sample of fluid may be collected from your abscess. The fluid is sent to a lab and tested for bacteria. Ask your healthcare provider for more  information.    A root canal  is a procedure to remove the bacteria and prevent more infection. It is usually done after an incision and drainage. A filling or crown will be placed over the tooth after you have healed from your root canal.     Tooth removal  may be needed if the infection affects deeper tissues. This is usually done after an incision and drainage.    Self-care:   Rinse your mouth every 2 hours with salt water.  This will help keep the area clean.     Gently brush your teeth twice a day with a soft tooth brush.  This will help keep the area clean.     Eat soft foods as directed.  Soft foods may cause less pain. Examples include applesauce, yogurt, and cooked pasta. Ask your healthcare provider how long to follow this instruction.     Apply a warm compress to your tooth or gum.  Use a cotton ball or gauze soaked in warm water. Remove the compress in 10 minutes or when it becomes cool. Repeat 3 times a day.    Prevent another abscess:   Brush your teeth at least 2 times a day  with fluoride toothpaste.    Use dental floss at least once a day  to clean between your teeth.    Rinse your mouth with water or mouthwash  after meals and snacks. Chew sugarless gum.    Avoid sugary and starchy food that can stick between your teeth.  Limit drinks high in sugar, such as soda or fruit juice.    See your dentist every 6 months  for dental cleanings and oral exams.    Follow up with your doctor or dentist as directed:  Your healthcare provider will need to check your teeth and gums. Write down your questions so you remember to ask them during your visits.   © Copyright [employer] 2023 Information is for End User's use only and may not be sold, redistributed or otherwise used for commercial purposes.  The above information is an  only. It is not intended as medical advice for individual conditions or treatments. Talk to your doctor, nurse or pharmacist before following any medical regimen to see if it  is safe and effective for you.

## 2024-05-08 NOTE — PROGRESS NOTES
[facility]        NAME: Chris Adams is a 33 y.o. male  : 1990    MRN: [id number]  DATE: May 8, 2024  TIME: 12:39 PM    Assessment and Plan   Dental abscess [K04.7]  1. Dental abscess  amoxicillin-clavulanate (AUGMENTIN) 875-125 mg per tablet    ibuprofen (MOTRIN) 600 mg tablet        Dental abscess noted. Prescribed course of Augmentin, ibuprofen. Advised symptomatic treatment. Patient aware he needs to follow up with oral surgery, already has referral in but has not had the time to go.    Patient Instructions     Prescribed course of Augmentin and ibuprofen, take as directed. May also utilize over the counter Tylenol.  Follow up with oral surgery.    Follow up with PCP in 3-5 days.  Proceed to  ER if symptoms worsen.    If tests are performed, our office will contact you with results only if changes need to made to the care plan discussed with you at the visit. [facility]    Chief Complaint     Chief Complaint   Patient presents with    Dental Pain     Patient complains of right-sided tooth pain x 2 days. States he has had an abscess tooth before and believes this is the same thing. He states he tried tylenol, which helped with pain, but did not help with the swelling.         History of Present Illness       Dental Pain   This is a new problem. Episode onset: about 2 days ago. The problem has been unchanged. The pain is at a severity of 7/10. Associated symptoms include facial pain and thermal sensitivity. Pertinent negatives include no difficulty swallowing, fever or oral bleeding. Treatments tried: ibuprofen. The treatment provided mild relief.       Review of Systems   Review of Systems   Constitutional:  Negative for chills and fever.   HENT:  Positive for dental problem. Negative for trouble swallowing.    Respiratory:  Negative for chest tightness, shortness of breath, wheezing and stridor.    Cardiovascular:  Negative for chest pain.  "  Musculoskeletal:  Negative for myalgias.         Current Medications       Current Outpatient Medications:     amoxicillin-clavulanate (AUGMENTIN) 875-125 mg per tablet, Take 1 tablet by mouth every 12 (twelve) hours for 10 days, Disp: 20 tablet, Rfl: 0    ibuprofen (MOTRIN) 600 mg tablet, Take 1 tablet (600 mg total) by mouth every 6 (six) hours as needed for mild pain, Disp: 30 tablet, Rfl: 0    Current Allergies     Allergies as of 05/08/2024 - Reviewed 04/12/2023   Allergen Reaction Noted    Onion - food allergy Itching 04/03/2020    Fish allergy - food allergy Swelling 05/21/2020            The following portions of the patient's history were reviewed and updated as appropriate: allergies, current medications, past family history, past medical history, past social history, past surgical history and problem list.     Past Medical History:   Diagnosis Date    Drug abuse (HCC)     Hepatitis C        Past Surgical History:   Procedure Laterality Date    TONSILLECTOMY         No family history on file.      Medications have been verified.        Objective   /71   Pulse 58   Temp 97.8 °F (36.6 °C)   Resp 18   Ht 5' 7\" (1.702 m)   Wt 68 kg (150 lb)   SpO2 99%   BMI 23.49 kg/m²        Physical Exam     Physical Exam  Vitals and nursing note reviewed.   Constitutional:       General: He is not in acute distress.     Appearance: Normal appearance. He is not ill-appearing.   HENT:      Mouth/Throat:      Mouth: Mucous membranes are moist.      Dentition: Abnormal dentition. Dental tenderness, gingival swelling, dental caries and dental abscesses (right lower) present.      Pharynx: Oropharynx is clear. Uvula midline. No pharyngeal swelling or posterior oropharyngeal erythema.   Cardiovascular:      Rate and Rhythm: Normal rate and regular rhythm.      Pulses: Normal pulses.      Heart sounds: Normal heart sounds.   Pulmonary:      Effort: Pulmonary effort is normal.      Breath sounds: Normal breath sounds. "   Neurological:      Mental Status: He is alert.

## 2024-09-20 ENCOUNTER — APPOINTMENT (OUTPATIENT)
Dept: LAB | Facility: MEDICAL CENTER | Age: 34
End: 2024-09-20
Payer: COMMERCIAL

## 2024-09-20 DIAGNOSIS — B18.2 CHRONIC HEPATITIS C WITHOUT HEPATIC COMA (HCC): ICD-10-CM

## 2024-09-20 DIAGNOSIS — R76.8 OTHER SPECIFIED ABNORMAL IMMUNOLOGICAL FINDINGS IN SERUM: ICD-10-CM

## 2024-09-20 DIAGNOSIS — Z20.6 CONTACT W AND (SUSPECTED) EXPOSURE TO HUMAN IMMUNODEF VIRUS: ICD-10-CM

## 2024-09-20 DIAGNOSIS — F11.20 OPIOID DEPENDENCE, UNCOMPLICATED (HCC): ICD-10-CM

## 2024-09-20 DIAGNOSIS — Z20.818 CONTACT W AND EXPOSURE TO OTH BACT COMMUNICABLE DISEASES: ICD-10-CM

## 2024-09-20 LAB
ALBUMIN SERPL BCG-MCNC: 4.6 G/DL (ref 3.5–5)
ALP SERPL-CCNC: 50 U/L (ref 34–104)
ALT SERPL W P-5'-P-CCNC: 68 U/L (ref 7–52)
ANION GAP SERPL CALCULATED.3IONS-SCNC: 8 MMOL/L (ref 4–13)
AST SERPL W P-5'-P-CCNC: 49 U/L (ref 13–39)
BASOPHILS # BLD AUTO: 0.03 THOUSANDS/ΜL (ref 0–0.1)
BASOPHILS NFR BLD AUTO: 1 % (ref 0–1)
BILIRUB SERPL-MCNC: 0.54 MG/DL (ref 0.2–1)
BUN SERPL-MCNC: 16 MG/DL (ref 5–25)
CALCIUM SERPL-MCNC: 9.6 MG/DL (ref 8.4–10.2)
CHLORIDE SERPL-SCNC: 103 MMOL/L (ref 96–108)
CO2 SERPL-SCNC: 28 MMOL/L (ref 21–32)
CREAT SERPL-MCNC: 0.87 MG/DL (ref 0.6–1.3)
EOSINOPHIL # BLD AUTO: 0.05 THOUSAND/ΜL (ref 0–0.61)
EOSINOPHIL NFR BLD AUTO: 1 % (ref 0–6)
ERYTHROCYTE [DISTWIDTH] IN BLOOD BY AUTOMATED COUNT: 12.1 % (ref 11.6–15.1)
GFR SERPL CREATININE-BSD FRML MDRD: 112 ML/MIN/1.73SQ M
GLUCOSE SERPL-MCNC: 85 MG/DL (ref 65–140)
HAV IGM SER QL: NORMAL
HBV CORE AB SER QL: NORMAL
HBV SURFACE AB SER-ACNC: 102 MIU/ML
HBV SURFACE AG SER QL: NORMAL
HCT VFR BLD AUTO: 44 % (ref 36.5–49.3)
HGB BLD-MCNC: 14.5 G/DL (ref 12–17)
HIV 1+2 AB+HIV1 P24 AG SERPL QL IA: NORMAL
HIV 2 AB SERPL QL IA: NORMAL
HIV1 AB SERPL QL IA: NORMAL
HIV1 P24 AG SERPL QL IA: NORMAL
IMM GRANULOCYTES # BLD AUTO: 0.01 THOUSAND/UL (ref 0–0.2)
IMM GRANULOCYTES NFR BLD AUTO: 0 % (ref 0–2)
INR PPP: 0.98 (ref 0.85–1.19)
LYMPHOCYTES # BLD AUTO: 1.87 THOUSANDS/ΜL (ref 0.6–4.47)
LYMPHOCYTES NFR BLD AUTO: 47 % (ref 14–44)
MCH RBC QN AUTO: 31.1 PG (ref 26.8–34.3)
MCHC RBC AUTO-ENTMCNC: 33 G/DL (ref 31.4–37.4)
MCV RBC AUTO: 94 FL (ref 82–98)
MONOCYTES # BLD AUTO: 0.34 THOUSAND/ΜL (ref 0.17–1.22)
MONOCYTES NFR BLD AUTO: 8 % (ref 4–12)
NEUTROPHILS # BLD AUTO: 1.73 THOUSANDS/ΜL (ref 1.85–7.62)
NEUTS SEG NFR BLD AUTO: 43 % (ref 43–75)
NRBC BLD AUTO-RTO: 0 /100 WBCS
PLATELET # BLD AUTO: 272 THOUSANDS/UL (ref 149–390)
PMV BLD AUTO: 10.2 FL (ref 8.9–12.7)
POTASSIUM SERPL-SCNC: 4.4 MMOL/L (ref 3.5–5.3)
PROT SERPL-MCNC: 7.4 G/DL (ref 6.4–8.4)
PROTHROMBIN TIME: 13.3 SECONDS (ref 12.3–15)
RBC # BLD AUTO: 4.66 MILLION/UL (ref 3.88–5.62)
SODIUM SERPL-SCNC: 139 MMOL/L (ref 135–147)
WBC # BLD AUTO: 4.03 THOUSAND/UL (ref 4.31–10.16)

## 2024-09-20 PROCEDURE — 87389 HIV-1 AG W/HIV-1&-2 AB AG IA: CPT

## 2024-09-20 PROCEDURE — 86706 HEP B SURFACE ANTIBODY: CPT

## 2024-09-20 PROCEDURE — 87517 HEPATITIS B DNA QUANT: CPT

## 2024-09-20 PROCEDURE — 85025 COMPLETE CBC W/AUTO DIFF WBC: CPT

## 2024-09-20 PROCEDURE — 87521 HEPATITIS C PROBE&RVRS TRNSC: CPT

## 2024-09-20 PROCEDURE — 87340 HEPATITIS B SURFACE AG IA: CPT

## 2024-09-20 PROCEDURE — 87902 NFCT AGT GNTYP ALYS HEP C: CPT

## 2024-09-20 PROCEDURE — 86709 HEPATITIS A IGM ANTIBODY: CPT

## 2024-09-20 PROCEDURE — 85610 PROTHROMBIN TIME: CPT

## 2024-09-20 PROCEDURE — 36415 COLL VENOUS BLD VENIPUNCTURE: CPT

## 2024-09-20 PROCEDURE — 80053 COMPREHEN METABOLIC PANEL: CPT

## 2024-09-20 PROCEDURE — 87522 HEPATITIS C REVRS TRNSCRPJ: CPT

## 2024-09-20 PROCEDURE — 86704 HEP B CORE ANTIBODY TOTAL: CPT

## 2024-09-23 LAB
HBV DNA SERPL NAA+PROBE-ACNC: NOT DETECTED [IU]/ML
HCV GENTYP SERPL NAA+PROBE: NORMAL
HCV RNA SERPL NAA+PROBE-ACNC: ABNORMAL IU/ML
HCV RNA SERPL NAA+PROBE-ACNC: DETECTED K[IU]/ML

## 2024-09-30 LAB — MISCELLANEOUS LAB TEST RESULT: NORMAL

## 2025-02-22 ENCOUNTER — OFFICE VISIT (OUTPATIENT)
Dept: URGENT CARE | Facility: MEDICAL CENTER | Age: 35
End: 2025-02-22
Payer: COMMERCIAL

## 2025-02-22 VITALS
RESPIRATION RATE: 18 BRPM | OXYGEN SATURATION: 99 % | HEART RATE: 66 BPM | DIASTOLIC BLOOD PRESSURE: 68 MMHG | SYSTOLIC BLOOD PRESSURE: 143 MMHG | TEMPERATURE: 99.9 F

## 2025-02-22 DIAGNOSIS — K02.9 DENTAL CARIES: ICD-10-CM

## 2025-02-22 DIAGNOSIS — K04.7 DENTAL ABSCESS: Primary | ICD-10-CM

## 2025-02-22 PROCEDURE — G0382 LEV 3 HOSP TYPE B ED VISIT: HCPCS | Performed by: FAMILY MEDICINE

## 2025-02-22 RX ORDER — ESCITALOPRAM OXALATE 20 MG/1
20 TABLET ORAL DAILY
COMMUNITY

## 2025-02-22 RX ORDER — ESCITALOPRAM OXALATE 10 MG/1
10 TABLET ORAL DAILY
COMMUNITY

## 2025-02-22 RX ORDER — BUPRENORPHINE AND NALOXONE 8; 2 MG/1; MG/1
1 FILM, SOLUBLE BUCCAL; SUBLINGUAL
COMMUNITY

## 2025-02-22 RX ORDER — LAMOTRIGINE 25 MG/1
25 TABLET ORAL DAILY
COMMUNITY

## 2025-02-22 RX ORDER — QUETIAPINE FUMARATE 50 MG/1
50 TABLET, FILM COATED ORAL DAILY PRN
COMMUNITY

## 2025-02-22 RX ORDER — VELPATASVIR AND SOFOSBUVIR 100; 400 MG/1; MG/1
TABLET, FILM COATED ORAL
COMMUNITY
Start: 2025-02-03

## 2025-02-22 RX ORDER — VARENICLINE TARTRATE 0.5 (11)-1
KIT ORAL
COMMUNITY
Start: 2024-11-23

## 2025-02-22 NOTE — PROGRESS NOTES
[facility]        NAME: Chris Adams is a 34 y.o. male  : 1990    MRN: [id number]  DATE: 2025  TIME: 2:22 PM    Assessment and Plan   Dental abscess [K04.7]  1. Dental abscess  amoxicillin-clavulanate (AUGMENTIN) 875-125 mg per tablet      2. Dental caries  Ambulatory Referral to Dentistry        Start Augmentin x 10 days  Continue ibuprofen as needed for pain and fever  Continue salt water and antiseptic mouthwash rinses  Follow-up if symptoms do not start improving in 2-3 days  Work note written      Patient Instructions       Follow up with PCP in 3-5 days.  Proceed to  ER if symptoms worsen.    If tests have been performed at [facility], our office will contact you with results if changes need to be made to the care plan discussed with you at the visit.  [facility]    Chief Complaint     Chief Complaint   Patient presents with   • Dental Problem     Patient reports that he has ongoing dental issues. He noted right side upper jaw pain with swelling x 3-4 days         History of Present Illness     HPI  Chris Adams is a 34 y.o. male  who presented to [facility] today with a 1 week history of right-sided tooth pain.  Patient states he has a history of dental cavities and is aware that he needs his teeth pulled but does not have time due to a very busy work schedule.  About 1 week ago he started to notice increased tooth pain especially in the right upper side, now it is red and swollen and very painful.    Review of Systems   Review of Systems   Constitutional:  Positive for fever. Negative for chills.   HENT:  Positive for dental problem. Negative for congestion, rhinorrhea and sore throat.    Respiratory:  Negative for cough and shortness of breath.    Cardiovascular:  Negative for chest pain.   Gastrointestinal:  Negative for diarrhea, nausea and vomiting.   Skin:  Negative for rash.   Neurological:  Negative  for dizziness and headaches.         Current Medications       Current Outpatient Medications:   •  amoxicillin-clavulanate (AUGMENTIN) 875-125 mg per tablet, Take 1 tablet by mouth every 12 (twelve) hours for 10 days, Disp: 20 tablet, Rfl: 0  •  Epclusa 400-100 MG tablet, , Disp: , Rfl:   •  Varenicline Tartrate, Starter, 0.5 MG X 11 & 1 MG X 42 TBPK, 1 TABLET, DOSE PACK ORAL AS DIRECTED, Disp: , Rfl:   •  buprenorphine-naloxone (Suboxone) 8-2 mg, Place 1 Film under the tongue, Disp: , Rfl:   •  escitalopram (LEXAPRO) 10 mg tablet, Take 10 mg by mouth daily, Disp: , Rfl:   •  escitalopram (LEXAPRO) 20 mg tablet, Take 20 mg by mouth daily, Disp: , Rfl:   •  ibuprofen (MOTRIN) 600 mg tablet, Take 1 tablet (600 mg total) by mouth every 6 (six) hours as needed for mild pain, Disp: 30 tablet, Rfl: 0  •  lamoTRIgine (LaMICtal) 25 mg tablet, Take 25 mg by mouth daily, Disp: , Rfl:   •  QUEtiapine (SEROquel) 50 mg tablet, Take 50 mg by mouth daily as needed, Disp: , Rfl:     Current Allergies     Allergies as of 02/22/2025 - Reviewed 02/22/2025   Allergen Reaction Noted   • Onion - food allergy Itching 04/03/2020   • Other GI Intolerance 02/22/2025   • Fish allergy - food allergy Swelling 05/21/2020   • Fish-derived products - food allergy GI Intolerance and Swelling 05/21/2020            The following portions of the patient's history were reviewed and updated as appropriate: allergies, current medications, past family history, past medical history, past social history, past surgical history and problem list.     Past Medical History:   Diagnosis Date   • Drug abuse (HCC)    • Hepatitis C        Past Surgical History:   Procedure Laterality Date   • TONSILLECTOMY         No family history on file.      Medications have been verified.        Objective   /68   Pulse 66   Temp 99.9 °F (37.7 °C)   Resp 18   SpO2 99%   No LMP for male patient.       Physical Exam     Physical Exam  Vitals and nursing note reviewed.    Constitutional:       Appearance: He is well-developed.   HENT:      Head: Normocephalic and atraumatic.      Mouth/Throat:      Mouth: Mucous membranes are moist.      Dentition: Abnormal dentition. Dental tenderness, gingival swelling and dental caries present.        Comments: Right upper teeth: + dental caries, +broken tooth, + swelling and tenderness  Cardiovascular:      Rate and Rhythm: Normal rate.   Pulmonary:      Effort: Pulmonary effort is normal. No respiratory distress.   Neurological:      Mental Status: He is alert and oriented to person, place, and time.   Psychiatric:         Mood and Affect: Mood normal.         Behavior: Behavior normal.

## 2025-02-22 NOTE — PATIENT INSTRUCTIONS
"Patient Education     Dental abscess   The Basics   Written by the doctors and editors at [facility]   What is a dental abscess? -- A dental abscess is a pocket of pus that forms in the mouth (figure 1). It is caused by an infection. It is also called a \"tooth abscess\" or \"abscessed tooth.\" Dental abscesses can form in the gums, next to a tooth, or in the end of the root of a tooth.  A dental abscess can be caused by bacteria that is found in plaque. Plaque is a sticky material that forms on the teeth and is made of germs and bits of food. Plaque is all throughout the mouth.  Regular brushing and flossing helps you get rid of plaque. If you don't do this, plaque can turn into a hard deposit called \"tartar.\" The bacteria can also spread into the soft tissue of your teeth and gums.  What causes a dental abscess? -- The most common causes are tooth decay and gum disease. Other possible causes include an injury to a tooth, or food or something else getting trapped under the gums.  What are the symptoms of a dental abscess? -- Pain is a common problem with a dental abscess. The pain is often throbbing and is near a tooth. The pain might spread into your jaw, neck, or ear. Lying down, chewing, or biting can make pain worse. If the abscess ruptures or breaks open, you might have relief from the pain along with a bad taste in your mouth.  A dental abscess can cause other symptoms besides pain. These might include:   Fever   Sensitive teeth (to hot or cold)   Bleeding, red, or swollen gums   Swelling in the lymph nodes in your neck, jaw, or face   Trouble opening your mouth all of the way   Bad breath, or a bad taste in your mouth  Will I need tests? -- Most problems with the teeth and mouth are treated by a dentist. The dentist will check your teeth, gums, and mouth. They will gently touch and tap on the teeth to check them. The dentist might ask about problems with heat or cold or if the pain is worse when you lie " down.  Sometimes, the dentist might do other tests, like an X-ray, to check your teeth.  How is a dental abscess treated? -- Treatment is based on what is causing the problem. Some of the treatments include:   Antibiotics to treat the infection   Making a small cut in the abscess to drain the pus, and then cleaning the area around it   Removing any irritant from under the gums or between the teeth, and deep cleaning the area with special dental tools and by rinsing it   Root canal - For this procedure, the dentist drills into the tooth to remove any pus and the infected pulp. Then, they seal the tooth to prevent another infection.   Pulling a tooth if it cannot be repaired  Is there anything I can do on my own to feel better? -- Ask the dentist what you should do when you go home. Make sure that you understand exactly what you need to do to care for yourself. Ask questions if there is anything you do not understand.  Your dentist might tell you to:   Take any antibiotics as prescribed. It is important to take all of your antibiotics even if you start to feel better.   Take medicines to relieve pain, such as acetaminophen (sample brand name: Tylenol), ibuprofen (sample brand names: Advil, Motrin), or naproxen (sample brand name: Aleve).   Avoid very cold or very hot food or drinks if they bother your tooth. Soft foods such as scrambled eggs or mashed potatoes might cause less pain with chewing.   Rinse your mouth with warm salt water. This can help drain the abscess. You can make salt water by mixing 1/4 to 1/2 teaspoon (1.5 to 3 grams) of salt into 8 ounces (240 mL) of warm water.  Can I do anything to prevent another dental abscess?    Brush your teeth at least 2 times a day. Use toothpaste with fluoride.   Use dental floss to clean between your teeth every day.   See your dentist for regular cleaning and checkups. The dentist might put fluoride or a sealant on your teeth.   Eat a healthy diet. Try to avoid or  limit foods and drinks that are high in acid, sugar, and starch. These include things like chocolate, sweets, cakes, and fizzy or sugary drinks.   Wear a mouth guard or headgear when playing sports. This can help to avoid tooth injury.   Stop smoking, if you smoke. Your doctor, dentist, or nurse can help you. Smoking can make some dental problems worse.  When should I call the doctor or dentist? -- Call for advice if:   You continue to have signs of infection, such as:   Fever of 100.4°F (38°C) or higher   Swelling of the gums, neck, or face   Discharge or pus around a tooth   The pain is getting worse or keeps you from sleeping.   You have trouble swallowing, breathing, chewing, or opening your mouth all of the way.   You have jaw pain with ear, chest, shoulder, or arm pain.  All topics are updated as new evidence becomes available and our peer review process is complete.  This topic retrieved from [employer] on: May 19, 2024.  Topic 687689 Version 1.0  Release: 32.4.3 - C32.138  © 2024 UpToDate, Inc. and/or its affiliates. All rights reserved.  figure 1: Dental abscess     Adental abscess is a collection of pus from an infection in the mouth. Dentalabscesses can form in the gums, next to a tooth, or in the root of a tooth.  Graphic 331240 Version 1.0  Consumer Information Use and Disclaimer   Disclaimer: This generalized information is a limited summary of diagnosis, treatment, and/or medication information. It is not meant to be comprehensive and should be used as a tool to help the user understand and/or assess potential diagnostic and treatment options. It does NOT include all information about conditions, treatments, medications, side effects, or risks that may apply to a specific patient. It is not intended to be medical advice or a substitute for the medical advice, diagnosis, or treatment of a health care provider based on the health care provider's examination and assessment of a patient's specific and  unique circumstances. Patients must speak with a health care provider for complete information about their health, medical questions, and treatment options, including any risks or benefits regarding use of medications. This information does not endorse any treatments or medications as safe, effective, or approved for treating a specific patient. UpToDate, Inc. and its affiliates disclaim any warranty or liability relating to this information or the use thereof.The use of this information is governed by the Terms of Use, available at https://www.woltersipviveuwer.com/en/know/clinical-effectiveness-terms. 2024© UpToDate, Inc. and its affiliates and/or licensors. All rights reserved.  Copyright   © 2024 UpToDate, Inc. and/or its affiliates. All rights reserved.

## 2025-03-13 NOTE — DISCHARGE INSTRUCTIONS
You have been seen for a rash  Please complete the full course of antibiotics as prescribed  Return to the emergency department if you develop worsening rash, fevers, lethargy or any other symptoms of concern  Please follow up with your PCP by calling the number provided  (2) Male

## 2025-03-31 ENCOUNTER — OFFICE VISIT (OUTPATIENT)
Dept: URGENT CARE | Facility: MEDICAL CENTER | Age: 35
End: 2025-03-31
Payer: COMMERCIAL

## 2025-03-31 VITALS
BODY MASS INDEX: 23.54 KG/M2 | SYSTOLIC BLOOD PRESSURE: 110 MMHG | HEIGHT: 67 IN | WEIGHT: 150 LBS | RESPIRATION RATE: 18 BRPM | OXYGEN SATURATION: 98 % | DIASTOLIC BLOOD PRESSURE: 80 MMHG | TEMPERATURE: 98.3 F | HEART RATE: 56 BPM

## 2025-03-31 DIAGNOSIS — K02.9 PAIN DUE TO DENTAL CARIES: Primary | ICD-10-CM

## 2025-03-31 PROCEDURE — G0382 LEV 3 HOSP TYPE B ED VISIT: HCPCS | Performed by: PHYSICIAN ASSISTANT

## 2025-03-31 RX ORDER — IBUPROFEN 800 MG/1
800 TABLET, FILM COATED ORAL EVERY 6 HOURS PRN
Qty: 30 TABLET | Refills: 0 | Status: SHIPPED | OUTPATIENT
Start: 2025-03-31

## 2025-03-31 RX ORDER — LIDOCAINE HYDROCHLORIDE 20 MG/ML
10 SOLUTION OROPHARYNGEAL 4 TIMES DAILY PRN
Qty: 100 ML | Refills: 0 | Status: SHIPPED | OUTPATIENT
Start: 2025-03-31

## 2025-03-31 RX ORDER — AMOXICILLIN 875 MG/1
875 TABLET, COATED ORAL 2 TIMES DAILY
Qty: 14 TABLET | Refills: 0 | Status: SHIPPED | OUTPATIENT
Start: 2025-03-31 | End: 2025-04-07

## 2025-03-31 NOTE — PROGRESS NOTES
[facility]        NAME: Chris Adams is a 34 y.o. male  : 1990    MRN: [id number]  DATE: 2025  TIME: 10:58 AM    Assessment and Plan   Pain due to dental caries [K02.9]  1. Pain due to dental caries  amoxicillin (AMOXIL) 875 mg tablet    Lidocaine Viscous HCl (XYLOCAINE) 2 % mucosal solution    ibuprofen (MOTRIN) 800 mg tablet            Patient Instructions       Follow up with dentist.    If tests have been performed at [facility], our office will contact you with results if changes need to be made to the care plan discussed with you at the visit.  You can review your full results on [facility].    Chief Complaint     Chief Complaint   Patient presents with    Dental Pain     Was here 1 month ago with abscess of tooth. Was on antibiotics and got better. Symptoms started again 4 days ago. Pain and swelling R side of his face. Believes the same area is infected again.          History of Present Illness       Dental Pain   This is a recurrent problem. The current episode started in the past 7 days. The problem occurs constantly. The problem has been gradually worsening. The pain is moderate. Associated symptoms include facial pain and thermal sensitivity. Pertinent negatives include no difficulty swallowing, fever, oral bleeding or sinus pressure. He has tried acetaminophen for the symptoms. The treatment provided no relief.       Review of Systems   Review of Systems   Constitutional:  Negative for fever.   HENT:  Negative for sinus pressure.    All other systems reviewed and are negative.        Current Medications       Current Outpatient Medications:     amoxicillin (AMOXIL) 875 mg tablet, Take 1 tablet (875 mg total) by mouth 2 (two) times a day for 7 days, Disp: 14 tablet, Rfl: 0    ibuprofen (MOTRIN) 800 mg tablet, Take 1 tablet (800 mg total) by mouth every 6 (six) hours as needed for moderate pain, Disp: 30 tablet, Rfl: 0    Lidocaine Viscous HCl (XYLOCAINE) 2 %  "mucosal solution, Swish and spit 10 mL 4 (four) times a day as needed for mouth pain or discomfort, Disp: 100 mL, Rfl: 0    buprenorphine-naloxone (Suboxone) 8-2 mg, Place 1 Film under the tongue, Disp: , Rfl:     Epclusa 400-100 MG tablet, , Disp: , Rfl:     escitalopram (LEXAPRO) 10 mg tablet, Take 10 mg by mouth daily, Disp: , Rfl:     escitalopram (LEXAPRO) 20 mg tablet, Take 20 mg by mouth daily, Disp: , Rfl:     lamoTRIgine (LaMICtal) 25 mg tablet, Take 25 mg by mouth daily, Disp: , Rfl:     QUEtiapine (SEROquel) 50 mg tablet, Take 50 mg by mouth daily as needed, Disp: , Rfl:     Varenicline Tartrate, Starter, 0.5 MG X 11 & 1 MG X 42 TBPK, 1 TABLET, DOSE PACK ORAL AS DIRECTED, Disp: , Rfl:     Current Allergies     Allergies as of 03/31/2025 - Reviewed 03/31/2025   Allergen Reaction Noted    Onion - food allergy Itching 04/03/2020    Other GI Intolerance 02/22/2025    Fish allergy - food allergy Swelling 05/21/2020    Fish-derived products - food allergy GI Intolerance and Swelling 05/21/2020            The following portions of the patient's history were reviewed and updated as appropriate: allergies, current medications, past family history, past medical history, past social history, past surgical history and problem list.     Past Medical History:   Diagnosis Date    Drug abuse (HCC)     Hepatitis C        Past Surgical History:   Procedure Laterality Date    TONSILLECTOMY         History reviewed. No pertinent family history.      Medications have been verified.        Objective   /80   Pulse 56   Temp 98.3 °F (36.8 °C) (Tympanic)   Resp 18   Ht 5' 7\" (1.702 m)   Wt 68 kg (150 lb)   SpO2 98%   BMI 23.49 kg/m²   No LMP for male patient.       Physical Exam     Physical Exam  Vitals and nursing note reviewed.   Constitutional:       Appearance: Normal appearance. He is normal weight.   Cardiovascular:      Rate and Rhythm: Normal rate and regular rhythm.      Pulses: Normal pulses.      Heart " sounds: Normal heart sounds.   Pulmonary:      Effort: Pulmonary effort is normal.      Breath sounds: Normal breath sounds.   Neurological:      Mental Status: He is alert.         Right lower molar decay with surrounding erythema and swelling.  No lymphadenopathy.

## 2025-03-31 NOTE — LETTER
March 31, 2025     Patient: Chris Adams   YOB: 1990   Date of Visit: 3/31/2025       To Whom It May Concern:    It is my medical opinion that Chris Adams may return to work on 04/01/2025 .    If you have any questions or concerns, please don't hesitate to call.         Sincerely,        Jerod Paez PA-C    CC: No Recipients

## 2025-04-08 ENCOUNTER — OFFICE VISIT (OUTPATIENT)
Dept: URGENT CARE | Facility: MEDICAL CENTER | Age: 35
End: 2025-04-08
Payer: COMMERCIAL

## 2025-04-08 VITALS
TEMPERATURE: 99.5 F | OXYGEN SATURATION: 100 % | RESPIRATION RATE: 16 BRPM | SYSTOLIC BLOOD PRESSURE: 115 MMHG | HEART RATE: 70 BPM | DIASTOLIC BLOOD PRESSURE: 71 MMHG

## 2025-04-08 DIAGNOSIS — K04.7 DENTAL ABSCESS: Primary | ICD-10-CM

## 2025-04-08 PROCEDURE — G0382 LEV 3 HOSP TYPE B ED VISIT: HCPCS

## 2025-04-08 NOTE — PROGRESS NOTES
[facility]        NAME: Chris Adams is a 34 y.o. male  : 1990    MRN: [id number]  DATE: 2025  TIME: 10:39 PM    Assessment and Plan   Dental abscess [K04.7]  1. Dental abscess  amoxicillin-clavulanate (AUGMENTIN) 875-125 mg per tablet            Patient Instructions   Take abx as prescribed   Follow up with dentis    Follow up with PCP in 3-5 days.  Proceed to  ER if symptoms worsen.    If tests have been performed at [facility], our office will contact you with results if changes need to be made to the care plan discussed with you at the visit.  You can review your full results on [facility].    Chief Complaint     Chief Complaint   Patient presents with    Dental Problem     Patient relates started with right bottom tooth pain and dental abscess x1 week ago and seen here.  Patient prescribed amoxicillin and symptoms improved.  States for past 2 days started with right facial swelling and mild pain.  Patient is trying to get an appointment with his dentist. Hoping to see dentist this week. He has new dental insurance.         History of Present Illness       Patient reports being evaluated at urgent care on 3/31 for a right lower dental abscess. He reports he was given amoxicillin and the pain and swelling improved. He finished his course of abx 2 days ago. Today he reports pain and swelling returned to his right lower side. He reports he has a dentist appointment coming up this week, He denies fevers/nausea/vomiting/facial pain and swelling        Review of Systems   Review of Systems   Constitutional:  Negative for chills and fever.   HENT:  Positive for dental problem. Negative for ear pain and sore throat.    Eyes:  Negative for pain and visual disturbance.   Respiratory:  Negative for cough and shortness of breath.    Cardiovascular:  Negative for chest pain and palpitations.   Gastrointestinal:  Negative for abdominal pain and vomiting.   Genitourinary:  Negative  for dysuria and hematuria.   Musculoskeletal:  Negative for arthralgias and back pain.   Skin:  Negative for color change and rash.   Neurological:  Negative for seizures and syncope.   All other systems reviewed and are negative.        Current Medications       Current Outpatient Medications:     amoxicillin-clavulanate (AUGMENTIN) 875-125 mg per tablet, Take 1 tablet by mouth every 12 (twelve) hours for 14 days, Disp: 28 tablet, Rfl: 0    buprenorphine-naloxone (Suboxone) 8-2 mg, Place 1 Film under the tongue, Disp: , Rfl:     ibuprofen (MOTRIN) 800 mg tablet, Take 1 tablet (800 mg total) by mouth every 6 (six) hours as needed for moderate pain, Disp: 30 tablet, Rfl: 0    Lidocaine Viscous HCl (XYLOCAINE) 2 % mucosal solution, Swish and spit 10 mL 4 (four) times a day as needed for mouth pain or discomfort, Disp: 100 mL, Rfl: 0    Epclusa 400-100 MG tablet, , Disp: , Rfl:     escitalopram (LEXAPRO) 10 mg tablet, Take 10 mg by mouth daily (Patient not taking: Reported on 4/8/2025), Disp: , Rfl:     escitalopram (LEXAPRO) 20 mg tablet, Take 20 mg by mouth daily (Patient not taking: Reported on 4/8/2025), Disp: , Rfl:     lamoTRIgine (LaMICtal) 25 mg tablet, Take 25 mg by mouth daily (Patient not taking: Reported on 4/8/2025), Disp: , Rfl:     QUEtiapine (SEROquel) 50 mg tablet, Take 50 mg by mouth daily as needed (Patient not taking: Reported on 4/8/2025), Disp: , Rfl:     Varenicline Tartrate, Starter, 0.5 MG X 11 & 1 MG X 42 TBPK, 1 TABLET, DOSE PACK ORAL AS DIRECTED (Patient not taking: Reported on 4/8/2025), Disp: , Rfl:     Current Allergies     Allergies as of 04/08/2025 - Reviewed 04/08/2025   Allergen Reaction Noted    Onion - food allergy Itching 04/03/2020    Other GI Intolerance 02/22/2025    Fish allergy - food allergy Swelling 05/21/2020    Fish-derived products - food allergy GI Intolerance and Swelling 05/21/2020            The following portions of the patient's history were reviewed and updated as  appropriate: allergies, current medications, past family history, past medical history, past social history, past surgical history and problem list.     Past Medical History:   Diagnosis Date    Drug abuse (HCC)     Hepatitis C        Past Surgical History:   Procedure Laterality Date    TONSILLECTOMY         No family history on file.      Medications have been verified.        Objective   /71   Pulse 70   Temp 99.5 °F (37.5 °C) (Temporal)   Resp 16   SpO2 100%   No LMP for male patient.       Physical Exam     Physical Exam  Constitutional:       General: He is not in acute distress.     Appearance: Normal appearance. He is normal weight. He is not ill-appearing, toxic-appearing or diaphoretic.   HENT:      Head: Normocephalic and atraumatic.      Right Ear: Tympanic membrane, ear canal and external ear normal. There is no impacted cerumen.      Left Ear: Tympanic membrane, ear canal and external ear normal. There is no impacted cerumen.      Nose: Nose normal. No congestion or rhinorrhea.      Mouth/Throat:      Mouth: Mucous membranes are moist.      Dentition: Dental tenderness and dental caries present.      Tonsils: No tonsillar exudate or tonsillar abscesses.        Comments: Swelling and erythema noted to patients right lower gum line He reports tenderness when eating and at rest.   Eyes:      Extraocular Movements: Extraocular movements intact.      Conjunctiva/sclera: Conjunctivae normal.      Pupils: Pupils are equal, round, and reactive to light.   Cardiovascular:      Rate and Rhythm: Normal rate and regular rhythm.   Pulmonary:      Effort: Pulmonary effort is normal.      Breath sounds: Normal breath sounds.   Abdominal:      General: Abdomen is flat. Bowel sounds are normal.   Musculoskeletal:         General: No swelling or tenderness. Normal range of motion.      Cervical back: Normal range of motion and neck supple.   Skin:     General: Skin is warm and dry.      Capillary Refill:  Capillary refill takes less than 2 seconds.   Neurological:      General: No focal deficit present.      Mental Status: He is alert and oriented to person, place, and time. Mental status is at baseline.      Cranial Nerves: No cranial nerve deficit.      Sensory: No sensory deficit.   Psychiatric:         Mood and Affect: Mood normal.         Behavior: Behavior normal.         Thought Content: Thought content normal.         Judgment: Judgment normal.

## 2025-05-04 ENCOUNTER — OFFICE VISIT (OUTPATIENT)
Dept: URGENT CARE | Facility: MEDICAL CENTER | Age: 35
End: 2025-05-04
Payer: COMMERCIAL

## 2025-05-04 VITALS
DIASTOLIC BLOOD PRESSURE: 70 MMHG | SYSTOLIC BLOOD PRESSURE: 119 MMHG | HEART RATE: 85 BPM | RESPIRATION RATE: 18 BRPM | TEMPERATURE: 98.1 F | OXYGEN SATURATION: 99 % | WEIGHT: 150 LBS | BODY MASS INDEX: 23.54 KG/M2 | HEIGHT: 67 IN

## 2025-05-04 DIAGNOSIS — Z02.4 DRIVER'S PERMIT PHYSICAL EXAMINATION: Primary | ICD-10-CM

## 2025-05-04 RX ORDER — BUPRENORPHINE HYDROCHLORIDE AND NALOXONE HYDROCHLORIDE DIHYDRATE 8; 2 MG/1; MG/1
TABLET SUBLINGUAL
COMMUNITY
Start: 2025-04-15

## 2025-05-04 NOTE — PROGRESS NOTES
[facility]        NAME: Chris Adams is a 34 y.o. male  : 1990    MRN: [id number]  DATE: May 4, 2025  TIME: 3:46 PM    Assessment and Plan   's permit physical examination [Z02.4]  1. 's permit physical examination          Unable to complete form as patient is on Sublocade for substance abuse. Discussed with patient need to see PCP for further documentation and form completion. Papers for local PCP and clinic given to patient.     Patient Instructions       Follow up with PCP in 3-5 days.  Proceed to  ER if symptoms worsen.    If tests have been performed at [facility], our office will contact you with results if changes need to be made to the care plan discussed with you at the visit.  You can review your full results on [facility].    Chief Complaint     Chief Complaint   Patient presents with   • Annual Exam     Patient here for  permit physical            History of Present Illness       Patient presents today for physical for 's permit  PHM, PSH and current medication reviewed  Patient/Parent offer no concerns or complaints at this time  Denies any hx of seizures, arrhythmias or syncope  Patient reports taking monthly Sublocade for substance abuse  Patient also currently being treated for Hep C        Review of Systems   Review of Systems   All other systems reviewed and are negative.        Current Medications       Current Outpatient Medications:   •  buprenorphine-naloxone (SUBOXONE) 8-2 mg per SL tablet, 1 TABLET, SUBLINGUAL SUBLINGUAL 3 TIMES PER DAY, Disp: , Rfl:   •  buprenorphine-naloxone (Suboxone) 8-2 mg, Place 1 Film under the tongue, Disp: , Rfl:   •  Epclusa 400-100 MG tablet, , Disp: , Rfl:   •  escitalopram (LEXAPRO) 10 mg tablet, Take 10 mg by mouth daily (Patient not taking: Reported on 2025), Disp: , Rfl:   •  escitalopram (LEXAPRO) 20 mg tablet, Take 20 mg by mouth daily (Patient not taking: Reported on 2025), Disp: , Rfl:   •  " ibuprofen (MOTRIN) 800 mg tablet, Take 1 tablet (800 mg total) by mouth every 6 (six) hours as needed for moderate pain, Disp: 30 tablet, Rfl: 0  •  lamoTRIgine (LaMICtal) 25 mg tablet, Take 25 mg by mouth daily (Patient not taking: Reported on 4/8/2025), Disp: , Rfl:   •  Lidocaine Viscous HCl (XYLOCAINE) 2 % mucosal solution, Swish and spit 10 mL 4 (four) times a day as needed for mouth pain or discomfort, Disp: 100 mL, Rfl: 0  •  QUEtiapine (SEROquel) 50 mg tablet, Take 50 mg by mouth daily as needed (Patient not taking: Reported on 4/8/2025), Disp: , Rfl:   •  Varenicline Tartrate, Starter, 0.5 MG X 11 & 1 MG X 42 TBPK, 1 TABLET, DOSE PACK ORAL AS DIRECTED (Patient not taking: Reported on 4/8/2025), Disp: , Rfl:     Current Allergies     Allergies as of 05/04/2025 - Reviewed 04/08/2025   Allergen Reaction Noted   • Onion - food allergy Itching 04/03/2020   • Other GI Intolerance 02/22/2025   • Fish allergy - food allergy Swelling 05/21/2020   • Fish-derived products - food allergy GI Intolerance and Swelling 05/21/2020            The following portions of the patient's history were reviewed and updated as appropriate: allergies, current medications, past family history, past medical history, past social history, past surgical history and problem list.     Past Medical History:   Diagnosis Date   • Drug abuse (HCC)    • Hepatitis C        Past Surgical History:   Procedure Laterality Date   • TONSILLECTOMY         No family history on file.      Medications have been verified.        Objective   /70   Pulse 85   Temp 98.1 °F (36.7 °C)   Resp 18   Ht 5' 7\" (1.702 m)   Wt 68 kg (150 lb)   SpO2 99%   BMI 23.49 kg/m²   No LMP for male patient.       Physical Exam     Physical Exam  Constitutional:       General: He is not in acute distress.     Appearance: Normal appearance. He is not ill-appearing.   HENT:      Head: Normocephalic and atraumatic.      Right Ear: Hearing, tympanic membrane, ear canal " and external ear normal.      Left Ear: Hearing, tympanic membrane, ear canal and external ear normal.      Nose: Nose normal.      Mouth/Throat:      Lips: Pink.      Mouth: Mucous membranes are moist.   Eyes:      General: Vision grossly intact. No visual field deficit.        Right eye: No discharge.         Left eye: No discharge.      Extraocular Movements: Extraocular movements intact.      Pupils: Pupils are equal, round, and reactive to light.   Cardiovascular:      Rate and Rhythm: Normal rate and regular rhythm.   Pulmonary:      Effort: Pulmonary effort is normal.      Breath sounds: Normal breath sounds.      Comments: No cough on exam  Musculoskeletal:         General: Normal range of motion.      Cervical back: Normal range of motion.   Lymphadenopathy:      Cervical: No cervical adenopathy.   Skin:     General: Skin is warm and dry.   Neurological:      Mental Status: He is alert and oriented to person, place, and time.      Cranial Nerves: Cranial nerves 2-12 are intact.      Sensory: Sensation is intact.      Motor: Motor function is intact.      Gait: Gait is intact.

## 2025-05-15 ENCOUNTER — OFFICE VISIT (OUTPATIENT)
Dept: URGENT CARE | Facility: MEDICAL CENTER | Age: 35
End: 2025-05-15
Payer: COMMERCIAL

## 2025-05-15 VITALS
OXYGEN SATURATION: 98 % | WEIGHT: 148 LBS | HEART RATE: 78 BPM | DIASTOLIC BLOOD PRESSURE: 76 MMHG | HEIGHT: 67 IN | RESPIRATION RATE: 20 BRPM | BODY MASS INDEX: 23.23 KG/M2 | SYSTOLIC BLOOD PRESSURE: 125 MMHG | TEMPERATURE: 99.1 F

## 2025-05-15 DIAGNOSIS — K04.7 DENTAL ABSCESS: Primary | ICD-10-CM

## 2025-05-15 PROCEDURE — G0382 LEV 3 HOSP TYPE B ED VISIT: HCPCS | Performed by: PHYSICIAN ASSISTANT

## 2025-05-15 NOTE — PROGRESS NOTES
[facility]        NAME: Chris Adams is a 34 y.o. male  : 1990    MRN: [id number]  DATE: May 15, 2025  TIME: 5:26 PM    Assessment and Plan   Dental abscess [K04.7]  1. Dental abscess  amoxicillin-clavulanate (AUGMENTIN) 875-125 mg per tablet            Patient Instructions   Patient was educated on dental abscess.  Patient was prescribed antibiotics.  Patient was told any trouble swallowing, increased redness or fevers go directly to ED.  Patient was educated on the importance of finishing prescription.  Patient was given a list of dental clinics.    Follow up with PCP in 3-5 days.  Proceed to  ER if symptoms worsen.    If tests have been performed at [facility], our office will contact you with results if changes need to be made to the care plan discussed with you at the visit.  You [facility] on [facility].    Chief Complaint     Chief Complaint   Patient presents with    Dental Pain     Seen here 1 month ago for same. Given abx. States did not finish abx and dental procedure that was scheduled was pushed back. States infection returned and he took last 2 days of left over abx.          History of Present Illness       Patient is a 34-year-old male who presents today to the urgent care complaining of dental pain.  Patient reports he has a history of dental abscesses.  Patient was last seen at the urgent care on 2025.  Patient at this time was prescribed Augmentin.  Patient reports he had 2 leftover tablets of Augmentin that he took earlier today that helped patient significantly. Patient is taking OTC anti-inflammatories and Tylenol with pain relief.  Patient has tried following with a dentist however has had changes in insurance.  Denies any current chest pain or shortness of breath.  Admits allergies to onion, fish allergy and fish derivative products.    Dental Pain         Review of Systems   Review of Systems   Constitutional: Negative.    HENT:  Positive  "for dental problem.    Respiratory: Negative.     Cardiovascular: Negative.    Psychiatric/Behavioral: Negative.           Current Medications     Current Medications[1]    Current Allergies     Allergies as of 05/15/2025 - Reviewed 05/15/2025   Allergen Reaction Noted    Onion - food allergy Itching 04/03/2020    Other GI Intolerance 02/22/2025    Fish allergy - food allergy Swelling 05/21/2020    Fish-derived products - food allergy GI Intolerance and Swelling 05/21/2020            The following portions of the patient's history were reviewed and updated as appropriate: allergies, current medications, past family history, past medical history, past social history, past surgical history and problem list.     Past Medical History:   Diagnosis Date    Drug abuse (HCC)     Hepatitis C        Past Surgical History:   Procedure Laterality Date    TONSILLECTOMY         History reviewed. No pertinent family history.      Medications have been verified.        Objective   /76   Pulse 78   Temp 99.1 °F (37.3 °C)   Resp 20   Ht 5' 7\" (1.702 m)   Wt 67.1 kg (148 lb)   SpO2 98%   BMI 23.18 kg/m²   No LMP for male patient.       Physical Exam     Physical Exam  Vitals and nursing note reviewed.   Constitutional:       Appearance: Normal appearance.   HENT:      Head: Normocephalic.      Right Ear: There is impacted cerumen.      Left Ear: There is impacted cerumen.      Mouth/Throat:      Mouth: Mucous membranes are moist.      Comments: No pain over bilateral TMJs.    No palpable anterior lymph nodes.    Dental caries noted    Cardiovascular:      Rate and Rhythm: Normal rate and regular rhythm.      Heart sounds: Normal heart sounds.   Pulmonary:      Breath sounds: Normal breath sounds. No wheezing.     Neurological:      General: No focal deficit present.      Mental Status: He is alert and oriented to person, place, and time.     Psychiatric:         Mood and Affect: Mood normal.         Behavior: Behavior " normal.                        [1]   Current Outpatient Medications:     amoxicillin-clavulanate (AUGMENTIN) 875-125 mg per tablet, Take 1 tablet by mouth every 12 (twelve) hours for 7 days, Disp: 14 tablet, Rfl: 0    buprenorphine-naloxone (SUBOXONE) 8-2 mg per SL tablet, 1 TABLET, SUBLINGUAL SUBLINGUAL 3 TIMES PER DAY, Disp: , Rfl:     buprenorphine-naloxone (Suboxone) 8-2 mg, Place 1 Film under the tongue, Disp: , Rfl:     Epclusa 400-100 MG tablet, , Disp: , Rfl:     escitalopram (LEXAPRO) 10 mg tablet, Take 10 mg by mouth daily (Patient not taking: Reported on 4/8/2025), Disp: , Rfl:     escitalopram (LEXAPRO) 20 mg tablet, Take 20 mg by mouth daily (Patient not taking: Reported on 4/8/2025), Disp: , Rfl:     ibuprofen (MOTRIN) 800 mg tablet, Take 1 tablet (800 mg total) by mouth every 6 (six) hours as needed for moderate pain, Disp: 30 tablet, Rfl: 0    lamoTRIgine (LaMICtal) 25 mg tablet, Take 25 mg by mouth daily (Patient not taking: Reported on 4/8/2025), Disp: , Rfl:     Lidocaine Viscous HCl (XYLOCAINE) 2 % mucosal solution, Swish and spit 10 mL 4 (four) times a day as needed for mouth pain or discomfort, Disp: 100 mL, Rfl: 0    QUEtiapine (SEROquel) 50 mg tablet, Take 50 mg by mouth daily as needed (Patient not taking: Reported on 4/8/2025), Disp: , Rfl:     Varenicline Tartrate, Starter, 0.5 MG X 11 & 1 MG X 42 TBPK, 1 TABLET, DOSE PACK ORAL AS DIRECTED (Patient not taking: Reported on 4/8/2025), Disp: , Rfl:

## 2025-05-15 NOTE — PATIENT INSTRUCTIONS
Patient was educated on dental abscess.  Patient was prescribed antibiotics.  Patient was told any trouble swallowing, increased redness or fevers go directly to ED.  Patient was educated on the importance of finishing prescription.  Patient was given a list of dental clinics.

## 2025-05-29 ENCOUNTER — OFFICE VISIT (OUTPATIENT)
Dept: FAMILY MEDICINE CLINIC | Facility: CLINIC | Age: 35
End: 2025-05-29
Payer: COMMERCIAL

## 2025-05-29 VITALS
WEIGHT: 150 LBS | SYSTOLIC BLOOD PRESSURE: 120 MMHG | HEART RATE: 68 BPM | BODY MASS INDEX: 23.54 KG/M2 | DIASTOLIC BLOOD PRESSURE: 74 MMHG | HEIGHT: 67 IN | OXYGEN SATURATION: 99 %

## 2025-05-29 DIAGNOSIS — F17.210 TOBACCO DEPENDENCE DUE TO CIGARETTES: ICD-10-CM

## 2025-05-29 DIAGNOSIS — F11.20 OPIOID USE DISORDER, SEVERE, DEPENDENCE (HCC): ICD-10-CM

## 2025-05-29 DIAGNOSIS — Z00.00 ANNUAL PHYSICAL EXAM: Primary | ICD-10-CM

## 2025-05-29 DIAGNOSIS — F32.A ANXIETY AND DEPRESSION: ICD-10-CM

## 2025-05-29 DIAGNOSIS — F41.9 ANXIETY AND DEPRESSION: ICD-10-CM

## 2025-05-29 DIAGNOSIS — B18.2 CHRONIC HEPATITIS C WITHOUT HEPATIC COMA (HCC): ICD-10-CM

## 2025-05-29 PROCEDURE — 99395 PREV VISIT EST AGE 18-39: CPT | Performed by: FAMILY MEDICINE

## 2025-05-29 NOTE — PROGRESS NOTES
Adult Annual Physical  Name: Chris Adams      : 1990      MRN: [id number]  Encounter Provider: Sara Coello MD  Encounter Date: 2025   Encounter department: [facility]    :  Assessment & Plan  Annual physical exam  - Satisfactory physical exam  - Given history of opioid use disorder will obtain urine drug screen prior to filling out 's license permit form  - Patient reports that he completed treatment for hepatitis C with Epclusa.  I have ordered hepatitis C RNA as well as hep B and hep A serologies  - Patient is interested in quitting tobacco and previously did well with Chantix; prescription sent for patient    Orders:    CBC and differential; Future    Comprehensive metabolic panel; Future    HIV 1/2 AB/AG w Reflex [facility] for 2 yr old and above; Future    Lipid Panel with Direct LDL reflex; Future    Hemoglobin A1C; Future    Opioid use disorder, severe, dependence (HCC)    Orders:    [facility] All Prescribed Meds and Special Instructions    Amphetamines, Methamphetamines    Butalbital    Phenobarbital    Secobarbital    Alprazolam    Clonazepam    Diazepam    Lorazepam    Gabapentin    Pregabalin    Cocaine    Heroin    Buprenorphine    Levorphanol    Meperidine    Naltrexone    Fentanyl    Methadone    Oxycodone    Tapentadol    THC    Tramadol    Codeine, Hydrocodone, Hydropmorphone, Morphine    Bath Salts    Ethyl Glucuronide/Ethyl Sulfate    Kratom    Spice    Methylphenidate    Phentermine    Validity Oxidant    Validity Creatinine    Validity pH    Validity Specific    Xylazine Definitive Test    Chronic hepatitis C without hepatic coma (HCC)    Orders:    Hepatitis C RNA, Quantitative PCR; Future    Hepatitis B surface antigen; Future    Hepatitis B surface antibody; Future    Hepatitis A antibody, total; Future    Anxiety and depression  Stable off medication at this time.         Tobacco dependence due to cigarettes    Orders:    Varenicline Tartrate, Starter,  (Chantix Starting Month Russell) 0.5 MG X 11 & 1 MG X 42 TBPK; 0.5 mg once daily for 3 days then 0.5mg twice daily for days 4-7 then 1 mg twice daily        Preventive Screenings:  - Diabetes Screening: screening up-to-date  - Hepatitis C screening: screening not indicated and has history of Hepatitis C   - HIV screening: screening up-to-date   - Lung cancer screening: screening not indicated   - Prostate cancer screening: screening not indicated     Immunizations:  - Immunizations due: Prevnar 20 and Hepatitis A    Counseling/Anticipatory Guidance:    - Drug use: discussed harms of illicit drug use and how it can negatively impact mental/physical health.   - Tobacco use: discussed harms of tobacco use and management options for quitting.   - Dental health: discussed importance of regular tooth brushing, flossing, and dental visits.   - Diet: discussed recommendations for a healthy/well-balanced diet.   - Exercise: the importance of regular exercise/physical activity was discussed. Recommend exercise 3-5 times per week for at least 30 minutes.       Depression Screening and Follow-up Plan: Patient was screened for depression during today's encounter. They screened negative with a PHQ-9 score of 0.          History of Present Illness     Chris Adams is a very pleasant 34-year-old male with a past medical history of IV drug abuse, hepatitis C, anxiety, depression and tobacco abuse who presents today for an encounter to establish care and annual physical.  Patient has been clean and sober for 2 to 3 years.  He was previously on Sublocade however states that he discontinued the medication on his own approximately a month ago.  He reports that he was also previously on Lexapro, Lamictal and Seroquel for his anxiety depression but has stopped those medications as well and reports that his mood is stable.  He denies any alcohol use but does smoke half a pack a day.  He reports that he was previously on Chantix which helped  "and is willing to start this medication again.  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  He states that recent blood work show that he is cured of hepatitis C.  He reports that ever since becoming clean and sober he has completely turned his life around and is now a manager at a restaurant and recently welcomed baby a year ago.  He is hoping to get his 's license permit and did bring paperwork to be completed.    Adult Annual Physical:  Patient presents for annual physical.     Diet and Physical Activity:  - Diet/Nutrition: well balanced diet.  - Exercise: walking.    Depression Screening:    - PHQ-9 Score: 0    General Health:  - Sleep: sleeps well.  - Vision: no vision problems.  - Dental: regular dental visits.    Review of Systems   Constitutional: Negative.    HENT: Negative.     Eyes: Negative.    Respiratory: Negative.     Cardiovascular: Negative.    Gastrointestinal: Negative.    Musculoskeletal: Negative.    Skin: Negative.    Neurological: Negative.    Psychiatric/Behavioral: Negative.           Objective   /74 (BP Location: Left arm, Patient Position: Sitting, Cuff Size: Standard)   Pulse 68   Ht 5' 7\" (1.702 m)   Wt 68 kg (150 lb)   SpO2 99%   BMI 23.49 kg/m²     Physical Exam  Constitutional:       General: He is not in acute distress.     Appearance: He is not ill-appearing.   HENT:      Head: Normocephalic and atraumatic.     Eyes:      General:         Right eye: No discharge.         Left eye: No discharge.      Extraocular Movements: Extraocular movements intact.       Cardiovascular:      Rate and Rhythm: Normal rate.   Pulmonary:      Effort: Pulmonary effort is normal. No respiratory distress.      Breath sounds: No wheezing.   Abdominal:      General: Bowel sounds are normal. There is no distension.      Palpations: Abdomen is soft.      Tenderness: There is no " abdominal tenderness.     Neurological:      General: No focal deficit present.      Mental Status: He is alert.      Motor: No weakness.      Coordination: Coordination normal.      Gait: Gait normal.      Deep Tendon Reflexes: Reflexes normal.     Psychiatric:         Mood and Affect: Mood normal.         Behavior: Behavior normal.

## 2025-05-30 RX ORDER — VARENICLINE TARTRATE 0.5 (11)-1
KIT ORAL
Qty: 53 EACH | Refills: 0 | Status: SHIPPED | OUTPATIENT
Start: 2025-05-30

## 2025-05-30 NOTE — ASSESSMENT & PLAN NOTE
Orders:    Hepatitis C RNA, Quantitative PCR; Future    Hepatitis B surface antigen; Future    Hepatitis B surface antibody; Future    Hepatitis A antibody, total; Future

## 2025-05-30 NOTE — ASSESSMENT & PLAN NOTE
Orders:    [facility] All Prescribed Meds and Special Instructions    Amphetamines, Methamphetamines    Butalbital    Phenobarbital    Secobarbital    Alprazolam    Clonazepam    Diazepam    Lorazepam    Gabapentin    Pregabalin    Cocaine    Heroin    Buprenorphine    Levorphanol    Meperidine    Naltrexone    Fentanyl    Methadone    Oxycodone    Tapentadol    THC    Tramadol    Codeine, Hydrocodone, Hydropmorphone, Morphine    Bath Salts    Ethyl Glucuronide/Ethyl Sulfate    Kratom    Spice    Methylphenidate    Phentermine    Validity Oxidant    Validity Creatinine    Validity pH    Validity Specific    Xylazine Definitive Test

## 2025-05-30 NOTE — ASSESSMENT & PLAN NOTE
Orders:    Varenicline Tartrate, Starter, (Chantix Starting Month Russell) 0.5 MG X 11 & 1 MG X 42 TBPK; 0.5 mg once daily for 3 days then 0.5mg twice daily for days 4-7 then 1 mg twice daily

## 2025-06-03 LAB
4OH-XYLAZINE UR QL CFM: NEGATIVE NG/ML
6MAM UR QL CFM: NEGATIVE NG/ML
7AMINOCLONAZEPAM UR QL CFM: NEGATIVE NG/ML
A-OH ALPRAZ UR QL CFM: NEGATIVE NG/ML
ACCEPTABLE CREAT UR QL: NORMAL MG/DL
ACCEPTIBLE SP GR UR QL: NORMAL
AMPHET UR QL CFM: NEGATIVE NG/ML
BUPRENORPHINE UR QL CFM: NORMAL NG/ML
BUTALBITAL UR QL CFM: NEGATIVE NG/ML
BZE UR QL CFM: NEGATIVE NG/ML
CODEINE UR QL CFM: NEGATIVE NG/ML
EDDP UR QL CFM: NEGATIVE NG/ML
ETHYL GLUCURONIDE UR QL CFM: NEGATIVE NG/ML
ETHYL SULFATE UR QL SCN: NEGATIVE NG/ML
EUTYLONE UR QL: NEGATIVE NG/ML
FENTANYL UR QL CFM: NEGATIVE NG/ML
GLIADIN IGG SER IA-ACNC: NEGATIVE NG/ML
HYDROCODONE UR QL CFM: NEGATIVE NG/ML
HYDROMORPHONE UR QL CFM: NEGATIVE NG/ML
LORAZEPAM UR QL CFM: NEGATIVE NG/ML
ME-PHENIDATE UR QL CFM: NEGATIVE NG/ML
MEPERIDINE UR QL CFM: NEGATIVE NG/ML
METHADONE UR QL CFM: NEGATIVE NG/ML
METHAMPHET UR QL CFM: NEGATIVE NG/ML
MORPHINE UR QL CFM: NEGATIVE NG/ML
NALTREXONE UR QL CFM: NEGATIVE NG/ML
NITRITE UR QL: NORMAL UG/ML
NORBUPRENORPHINE UR QL CFM: NORMAL NG/ML
NORDIAZEPAM UR QL CFM: NEGATIVE NG/ML
NORFENTANYL UR QL CFM: NEGATIVE NG/ML
NORHYDROCODONE UR QL CFM: NEGATIVE NG/ML
NORMEPERIDINE UR QL CFM: NEGATIVE NG/ML
NOROXYCODONE UR QL CFM: NEGATIVE NG/ML
OXAZEPAM UR QL CFM: NEGATIVE NG/ML
OXYCODONE UR QL CFM: NEGATIVE NG/ML
OXYMORPHONE UR QL CFM: NEGATIVE NG/ML
PARA-FLUOROFENTANYL QUANTIFICATION: NORMAL NG/ML
PHENOBARB UR QL CFM: NEGATIVE NG/ML
RESULT ALL_PRESCRIBED MEDS AND SPECIAL INSTRUCTIONS: NORMAL
SECOBARBITAL UR QL CFM: NEGATIVE NG/ML
SL AMB 5F-ADB-M7 METABOLITE QUANTIFICATION: NEGATIVE NG/ML
SL AMB 7-OH-MITRAGYNINE (KRATOM ALKALOID) QUANTIFICATION: NEGATIVE NG/ML
SL AMB AB-FUBINACA-M3 METABOLITE QUANTIFICATION: NEGATIVE NG/ML
SL AMB ACETYL FENTANYL QUANTIFICATION: NORMAL NG/ML
SL AMB ACETYL NORFENTANYL QUANTIFICATION: NORMAL NG/ML
SL AMB ACRYL FENTANYL QUANTIFICATION: NORMAL NG/ML
SL AMB CARFENTANIL QUANTIFICATION: NORMAL NG/ML
SL AMB CTHC (MARIJUANA METABOLITE) QUANTIFICATION: ABNORMAL NG/ML
SL AMB DEXTRORPHAN (DEXTROMETHORPHAN METABOLITE) QUANT: NEGATIVE NG/ML
SL AMB GABAPENTIN QUANTIFICATION: NEGATIVE NG/ML
SL AMB JWH018 METABOLITE QUANTIFICATION: NEGATIVE NG/ML
SL AMB JWH073 METABOLITE QUANTIFICATION: NEGATIVE NG/ML
SL AMB MDMB-FUBINACA-M1 METABOLITE QUANTIFICATION: NEGATIVE NG/ML
SL AMB METHYLONE QUANTIFICATION: NEGATIVE NG/ML
SL AMB N-DESMETHYL-TRAMADOL QUANTIFICATION: NEGATIVE NG/ML
SL AMB PHENTERMINE QUANTIFICATION: NEGATIVE NG/ML
SL AMB PREGABALIN QUANTIFICATION: NEGATIVE NG/ML
SL AMB RCS4 METABOLITE QUANTIFICATION: NEGATIVE NG/ML
SL AMB RITALINIC ACID QUANTIFICATION: NEGATIVE NG/ML
SMOOTH MUSCLE AB TITR SER IF: NEGATIVE NG/ML
SPECIMEN DRAWN SERPL: NEGATIVE NG/ML
SPECIMEN PH ACCEPTABLE UR: NORMAL
TAPENTADOL UR QL CFM: NEGATIVE NG/ML
TEMAZEPAM UR QL CFM: NEGATIVE NG/ML
TRAMADOL UR QL CFM: NEGATIVE NG/ML
URATE/CREAT 24H UR: NEGATIVE NG/ML
XYLAZINE UR QL CFM: NEGATIVE NG/ML

## 2025-06-04 ENCOUNTER — RESULTS FOLLOW-UP (OUTPATIENT)
Dept: FAMILY MEDICINE CLINIC | Facility: CLINIC | Age: 35
End: 2025-06-04

## 2025-06-11 NOTE — TELEPHONE ENCOUNTER
----- Message from Sara Coello MD sent at 6/4/2025  9:12 PM EDT -----  Drug screen positive for marijuana which he reported during our office visit and buprenorphine which he was getting from [facility]. Will complete form for patient to   ----- Message -----  From: Interface: Incoming Lab Results [id number]  Sent: 6/3/2025   7:59 PM EDT  To: Sara Coello MD

## 2025-07-16 ENCOUNTER — APPOINTMENT (OUTPATIENT)
Dept: RADIOLOGY | Facility: MEDICAL CENTER | Age: 35
End: 2025-07-16
Attending: PHYSICIAN ASSISTANT
Payer: COMMERCIAL

## 2025-07-16 ENCOUNTER — OFFICE VISIT (OUTPATIENT)
Dept: URGENT CARE | Facility: MEDICAL CENTER | Age: 35
End: 2025-07-16
Payer: COMMERCIAL

## 2025-07-16 VITALS
DIASTOLIC BLOOD PRESSURE: 79 MMHG | TEMPERATURE: 98.7 F | WEIGHT: 150 LBS | SYSTOLIC BLOOD PRESSURE: 131 MMHG | OXYGEN SATURATION: 98 % | BODY MASS INDEX: 23.54 KG/M2 | HEART RATE: 74 BPM | RESPIRATION RATE: 20 BRPM | HEIGHT: 67 IN

## 2025-07-16 DIAGNOSIS — K08.89 TOOTHACHE: ICD-10-CM

## 2025-07-16 DIAGNOSIS — S23.41XA SPRAIN OF COSTAL CARTILAGE, INITIAL ENCOUNTER: Primary | ICD-10-CM

## 2025-07-16 PROCEDURE — G0382 LEV 3 HOSP TYPE B ED VISIT: HCPCS | Performed by: PHYSICIAN ASSISTANT

## 2025-07-16 PROCEDURE — 71101 X-RAY EXAM UNILAT RIBS/CHEST: CPT

## 2025-07-16 RX ORDER — IBUPROFEN 600 MG/1
600 TABLET, FILM COATED ORAL EVERY 6 HOURS PRN
Qty: 30 TABLET | Refills: 0 | Status: SHIPPED | OUTPATIENT
Start: 2025-07-16

## 2025-07-16 NOTE — PROGRESS NOTES
"[facility]        NAME: Chris Adams is a 35 y.o. male  : 1990    MRN: [id number]  DATE: 2025  TIME: 4:13 PM    /79   Pulse 74   Temp 98.7 °F (37.1 °C)   Resp 20   Ht 5' 7\" (1.702 m)   Wt 68 kg (150 lb)   SpO2 98%   BMI 23.49 kg/m²     Assessment and Plan   Sprain of costal cartilage, initial encounter [S23.41XA]  1. Sprain of costal cartilage, initial encounter  XR ribs right w pa chest min 3 views    XR ribs right w pa chest min 3 views    ibuprofen (MOTRIN) 600 mg tablet    amoxicillin-clavulanate (AUGMENTIN) 875-125 mg per tablet      2. Toothache  ibuprofen (MOTRIN) 600 mg tablet    amoxicillin-clavulanate (AUGMENTIN) 875-125 mg per tablet            Patient Instructions       Follow up with PCP in 3-5 days.  Proceed to  ER if symptoms worsen.    Chief Complaint     Chief Complaint   Patient presents with    Dental Pain     Tooth pain, mid back pain X2 days         History of Present Illness       Pt with chronic dental pain and also with right lower rib pain x 2 days     Dental Pain         Review of Systems   Review of Systems   Constitutional: Negative.    HENT:  Positive for dental problem.    Eyes: Negative.    Respiratory: Negative.     Cardiovascular: Negative.    Gastrointestinal: Negative.    Endocrine: Negative.    Genitourinary: Negative.    Musculoskeletal: Negative.    Skin: Negative.    Allergic/Immunologic: Negative.    Neurological: Negative.    Hematological: Negative.    Psychiatric/Behavioral: Negative.     All other systems reviewed and are negative.        Current Medications     Current Medications[1]    Current Allergies     Allergies as of 2025    (No Known Allergies)            The following portions of the patient's history were reviewed and updated as appropriate: allergies, current medications, past family history, past medical history, past social history, past surgical history and problem list.     Past Medical History[2]    Past " "Surgical History[3]    Family History[4]      Medications have been verified.        Objective   /79   Pulse 74   Temp 98.7 °F (37.1 °C)   Resp 20   Ht 5' 7\" (1.702 m)   Wt 68 kg (150 lb)   SpO2 98%   BMI 23.49 kg/m²        Physical Exam     Physical Exam  Vitals and nursing note reviewed.   Constitutional:       Appearance: Normal appearance. He is normal weight.   HENT:      Head: Normocephalic and atraumatic.      Right Ear: Tympanic membrane, ear canal and external ear normal.      Left Ear: Ear canal and external ear normal.      Nose: Nose normal.      Mouth/Throat:      Mouth: Mucous membranes are moist.      Pharynx: Oropharynx is clear.      Comments: Significant dental decay  lower front gum pain and swelling    Eyes:      Extraocular Movements: Extraocular movements intact.      Pupils: Pupils are equal, round, and reactive to light.       Cardiovascular:      Rate and Rhythm: Normal rate and regular rhythm.   Pulmonary:      Effort: Pulmonary effort is normal.      Breath sounds: Normal breath sounds.      Comments: Right lower rib pain mid axillary line   Abdominal:      Palpations: Abdomen is soft.     Musculoskeletal:         General: Normal range of motion.      Cervical back: Normal range of motion and neck supple.      Comments: Right lower rib right lower rib lateral pain mid axillary line      Skin:     General: Skin is warm.      Capillary Refill: Capillary refill takes less than 2 seconds.     Neurological:      Mental Status: He is alert and oriented to person, place, and time.     Psychiatric:         Behavior: Behavior normal.                          [1]   Current Outpatient Medications:     amoxicillin-clavulanate (AUGMENTIN) 875-125 mg per tablet, Take 1 tablet by mouth every 12 (twelve) hours for 10 days, Disp: 20 tablet, Rfl: 0    ibuprofen (MOTRIN) 600 mg tablet, Take 1 tablet (600 mg total) by mouth every 6 (six) hours as needed for mild pain, Disp: 30 tablet, Rfl: 0    " NON FORMULARY, Pt takes suboxone inj. monthly and is unsure of dose, Disp: , Rfl:     Varenicline Tartrate, Starter, (Chantix Starting Month Pak) 0.5 MG X 11 & 1 MG X 42 TBPK, 0.5 mg once daily for 3 days then 0.5mg twice daily for days 4-7 then 1 mg twice daily, Disp: 53 each, Rfl: 0  [2]   Past Medical History:  Diagnosis Date    Drug abuse (HCC)     Hepatitis C    [3]   Past Surgical History:  Procedure Laterality Date    TONSILLECTOMY     [4] No family history on file.

## 2025-07-22 ENCOUNTER — OFFICE VISIT (OUTPATIENT)
Dept: FAMILY MEDICINE CLINIC | Facility: CLINIC | Age: 35
End: 2025-07-22
Payer: COMMERCIAL

## 2025-07-22 VITALS
BODY MASS INDEX: 23.07 KG/M2 | SYSTOLIC BLOOD PRESSURE: 118 MMHG | HEIGHT: 67 IN | OXYGEN SATURATION: 99 % | WEIGHT: 147 LBS | DIASTOLIC BLOOD PRESSURE: 76 MMHG | HEART RATE: 62 BPM | TEMPERATURE: 97.8 F

## 2025-07-22 DIAGNOSIS — R68.82 LOW LIBIDO: Primary | ICD-10-CM

## 2025-07-22 PROCEDURE — 99213 OFFICE O/P EST LOW 20 MIN: CPT | Performed by: FAMILY MEDICINE

## 2025-07-22 RX ORDER — NICOTINE 21 MG/24HR
1 PATCH, TRANSDERMAL 24 HOURS TRANSDERMAL EVERY 24 HOURS
COMMUNITY
Start: 2025-06-24

## 2025-07-22 RX ORDER — TRAZODONE HYDROCHLORIDE 50 MG/1
50 TABLET ORAL
COMMUNITY
Start: 2025-07-16

## 2025-07-22 RX ORDER — LAMOTRIGINE 25 MG/1
TABLET ORAL
COMMUNITY
Start: 2025-06-11

## 2025-07-23 NOTE — PROGRESS NOTES
Name: Chris Adams      : 1990      MRN: [id number]  Encounter Provider: Allie Arcos MD  Encounter Date: 2025   Encounter department: [place] [facility]  :  Assessment & Plan  Low libido    Orders:    Testosterone, free, total; Future           History of Present Illness   HPI  Chief Complaint   Patient presents with    health issue discussion     Pt feels like testosterone levels are not high enough and sex drive isnt there; some meds have been changed. Wants to go over issues since becoming sober from drug use.   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  Negative for chills and fever.   HENT: Negative.  Negative for ear pain and sore throat.    Eyes:  Negative for pain and visual disturbance.   Respiratory: Negative.  Negative for cough and shortness of breath.    Cardiovascular: Negative.  Negative for chest pain and palpitations.   Gastrointestinal: Negative.  Negative for abdominal pain and vomiting.   Genitourinary: Negative.  Negative for dysuria and hematuria.   Musculoskeletal: Negative.  Negative for arthralgias and back pain.   Skin: Negative.  Negative for color change and rash.   Neurological: Negative.  Negative for seizures and syncope.   Psychiatric/Behavioral: Negative.     All other systems reviewed and are  "negative.      Objective   /76 (BP Location: Left arm, Patient Position: Sitting, Cuff Size: Standard)   Pulse 62   Temp 97.8 °F (36.6 °C) (Temporal)   Ht 5' 7\" (1.702 m)   Wt 66.7 kg (147 lb)   SpO2 99%   BMI 23.02 kg/m²      Physical Exam  Vitals and nursing note reviewed.   Constitutional:       Appearance: Normal appearance.   HENT:      Head: Normocephalic.     Eyes:      Pupils: Pupils are equal, round, and reactive to light.     Pulmonary:      Effort: Pulmonary effort is normal. No respiratory distress.     Musculoskeletal:      Cervical back: Neck supple.     Neurological:      General: No focal deficit present.      Mental Status: He is alert and oriented to person, place, and time.     Psychiatric:         Mood and Affect: Mood normal.         Behavior: Behavior normal.         "

## 2025-07-30 ENCOUNTER — APPOINTMENT (OUTPATIENT)
Dept: LAB | Facility: MEDICAL CENTER | Age: 35
End: 2025-07-30
Payer: COMMERCIAL

## 2025-07-30 DIAGNOSIS — Z00.00 ANNUAL PHYSICAL EXAM: ICD-10-CM

## 2025-07-30 DIAGNOSIS — B18.2 CHRONIC HEPATITIS C WITHOUT HEPATIC COMA (HCC): ICD-10-CM

## 2025-07-30 LAB
ALBUMIN SERPL BCG-MCNC: 4.6 G/DL (ref 3.5–5)
ALP SERPL-CCNC: 50 U/L (ref 34–104)
ALT SERPL W P-5'-P-CCNC: 15 U/L (ref 7–52)
ANION GAP SERPL CALCULATED.3IONS-SCNC: 9 MMOL/L (ref 4–13)
AST SERPL W P-5'-P-CCNC: 23 U/L (ref 13–39)
BASOPHILS # BLD AUTO: 0.04 THOUSANDS/ÂΜL (ref 0–0.1)
BASOPHILS NFR BLD AUTO: 1 % (ref 0–1)
BILIRUB SERPL-MCNC: 0.48 MG/DL (ref 0.2–1)
BUN SERPL-MCNC: 20 MG/DL (ref 5–25)
CALCIUM SERPL-MCNC: 9.3 MG/DL (ref 8.4–10.2)
CHLORIDE SERPL-SCNC: 105 MMOL/L (ref 96–108)
CHOLEST SERPL-MCNC: 152 MG/DL (ref ?–200)
CO2 SERPL-SCNC: 25 MMOL/L (ref 21–32)
CREAT SERPL-MCNC: 0.9 MG/DL (ref 0.6–1.3)
EOSINOPHIL # BLD AUTO: 0.05 THOUSAND/ÂΜL (ref 0–0.61)
EOSINOPHIL NFR BLD AUTO: 1 % (ref 0–6)
ERYTHROCYTE [DISTWIDTH] IN BLOOD BY AUTOMATED COUNT: 11.9 % (ref 11.6–15.1)
EST. AVERAGE GLUCOSE BLD GHB EST-MCNC: 105 MG/DL
GFR SERPL CREATININE-BSD FRML MDRD: 110 ML/MIN/1.73SQ M
GLUCOSE P FAST SERPL-MCNC: 83 MG/DL (ref 65–99)
HBA1C MFR BLD: 5.3 %
HCT VFR BLD AUTO: 40.3 % (ref 36.5–49.3)
HDLC SERPL-MCNC: 44 MG/DL
HGB BLD-MCNC: 13.6 G/DL (ref 12–17)
IMM GRANULOCYTES # BLD AUTO: 0.02 THOUSAND/UL (ref 0–0.2)
IMM GRANULOCYTES NFR BLD AUTO: 0 % (ref 0–2)
LDLC SERPL CALC-MCNC: 97 MG/DL (ref 0–100)
LYMPHOCYTES # BLD AUTO: 3.02 THOUSANDS/ÂΜL (ref 0.6–4.47)
LYMPHOCYTES NFR BLD AUTO: 42 % (ref 14–44)
MCH RBC QN AUTO: 30.4 PG (ref 26.8–34.3)
MCHC RBC AUTO-ENTMCNC: 33.7 G/DL (ref 31.4–37.4)
MCV RBC AUTO: 90 FL (ref 82–98)
MONOCYTES # BLD AUTO: 0.42 THOUSAND/ÂΜL (ref 0.17–1.22)
MONOCYTES NFR BLD AUTO: 6 % (ref 4–12)
NEUTROPHILS # BLD AUTO: 3.57 THOUSANDS/ÂΜL (ref 1.85–7.62)
NEUTS SEG NFR BLD AUTO: 50 % (ref 43–75)
NRBC BLD AUTO-RTO: 0 /100 WBCS
PLATELET # BLD AUTO: 293 THOUSANDS/UL (ref 149–390)
PMV BLD AUTO: 9.4 FL (ref 8.9–12.7)
POTASSIUM SERPL-SCNC: 3.6 MMOL/L (ref 3.5–5.3)
PROT SERPL-MCNC: 7.7 G/DL (ref 6.4–8.4)
RBC # BLD AUTO: 4.48 MILLION/UL (ref 3.88–5.62)
SODIUM SERPL-SCNC: 139 MMOL/L (ref 135–147)
TRIGL SERPL-MCNC: 53 MG/DL (ref ?–150)
WBC # BLD AUTO: 7.12 THOUSAND/UL (ref 4.31–10.16)

## 2025-07-30 PROCEDURE — 87340 HEPATITIS B SURFACE AG IA: CPT

## 2025-07-30 PROCEDURE — 86708 HEPATITIS A ANTIBODY: CPT

## 2025-07-30 PROCEDURE — 80053 COMPREHEN METABOLIC PANEL: CPT

## 2025-07-30 PROCEDURE — 86706 HEP B SURFACE ANTIBODY: CPT

## 2025-07-30 PROCEDURE — 87389 HIV-1 AG W/HIV-1&-2 AB AG IA: CPT

## 2025-07-30 PROCEDURE — 87522 HEPATITIS C REVRS TRNSCRPJ: CPT

## 2025-07-30 PROCEDURE — 36415 COLL VENOUS BLD VENIPUNCTURE: CPT

## 2025-07-30 PROCEDURE — 85025 COMPLETE CBC W/AUTO DIFF WBC: CPT

## 2025-07-30 PROCEDURE — 80061 LIPID PANEL: CPT

## 2025-07-30 PROCEDURE — 83036 HEMOGLOBIN GLYCOSYLATED A1C: CPT

## 2025-07-31 LAB
HAV AB SER QL IA: NORMAL
HBV SURFACE AB SER-ACNC: 107 MIU/ML
HBV SURFACE AG SER QL: NORMAL
HIV 1+2 AB+HIV1 P24 AG SERPL QL IA: NORMAL

## 2025-08-01 ENCOUNTER — OFFICE VISIT (OUTPATIENT)
Dept: URGENT CARE | Facility: MEDICAL CENTER | Age: 35
End: 2025-08-01
Payer: COMMERCIAL

## 2025-08-01 VITALS
HEART RATE: 61 BPM | DIASTOLIC BLOOD PRESSURE: 79 MMHG | SYSTOLIC BLOOD PRESSURE: 126 MMHG | HEIGHT: 67 IN | BODY MASS INDEX: 22.82 KG/M2 | TEMPERATURE: 99 F | OXYGEN SATURATION: 99 % | WEIGHT: 145.4 LBS | RESPIRATION RATE: 18 BRPM

## 2025-08-01 DIAGNOSIS — K04.7 DENTAL INFECTION: Primary | ICD-10-CM

## 2025-08-01 LAB — HCV RNA SERPL NAA+PROBE-ACNC: NOT DETECTED K[IU]/ML

## 2025-08-01 PROCEDURE — G0382 LEV 3 HOSP TYPE B ED VISIT: HCPCS | Performed by: NURSE PRACTITIONER

## 2025-08-01 RX ORDER — PENICILLIN V POTASSIUM 500 MG/1
500 TABLET, FILM COATED ORAL EVERY 6 HOURS SCHEDULED
Qty: 28 TABLET | Refills: 0 | Status: SHIPPED | OUTPATIENT
Start: 2025-08-01 | End: 2025-08-08